# Patient Record
Sex: FEMALE | Race: WHITE | NOT HISPANIC OR LATINO
[De-identification: names, ages, dates, MRNs, and addresses within clinical notes are randomized per-mention and may not be internally consistent; named-entity substitution may affect disease eponyms.]

---

## 2018-11-26 ENCOUNTER — MEDICATION RENEWAL (OUTPATIENT)
Age: 23
End: 2018-11-26

## 2019-02-06 ENCOUNTER — APPOINTMENT (OUTPATIENT)
Dept: INTERNAL MEDICINE | Facility: CLINIC | Age: 24
End: 2019-02-06
Payer: COMMERCIAL

## 2019-02-06 ENCOUNTER — RECORD ABSTRACTING (OUTPATIENT)
Age: 24
End: 2019-02-06

## 2019-02-06 VITALS
BODY MASS INDEX: 26.68 KG/M2 | SYSTOLIC BLOOD PRESSURE: 110 MMHG | HEIGHT: 62 IN | DIASTOLIC BLOOD PRESSURE: 80 MMHG | WEIGHT: 145 LBS

## 2019-02-06 DIAGNOSIS — Z78.9 OTHER SPECIFIED HEALTH STATUS: ICD-10-CM

## 2019-02-06 DIAGNOSIS — G43.009 MIGRAINE W/OUT AURA, NOT INTRACTABLE, W/OUT STATUS MIGRAINOSUS: ICD-10-CM

## 2019-02-06 DIAGNOSIS — M94.0 CHONDROCOSTAL JUNCTION SYNDROME [TIETZE]: ICD-10-CM

## 2019-02-06 DIAGNOSIS — F41.9 ANXIETY DISORDER, UNSPECIFIED: ICD-10-CM

## 2019-02-06 PROCEDURE — 99214 OFFICE O/P EST MOD 30 MIN: CPT

## 2019-02-06 RX ORDER — CETIRIZINE HYDROCHLORIDE 10 MG/1
10 TABLET, FILM COATED ORAL
Refills: 0 | Status: DISCONTINUED | COMMUNITY
End: 2019-02-06

## 2019-02-06 RX ORDER — FLUTICASONE PROPIONATE 50 UG/1
50 SPRAY, METERED NASAL
Refills: 0 | Status: DISCONTINUED | COMMUNITY
End: 2019-02-06

## 2019-02-06 RX ORDER — PSYLLIUM HUSK 0.4 G
CAPSULE ORAL
Refills: 0 | Status: DISCONTINUED | COMMUNITY
End: 2019-02-06

## 2019-02-06 RX ORDER — RIZATRIPTAN BENZOATE 10 MG/1
10 TABLET ORAL
Qty: 10 | Refills: 3 | Status: DISCONTINUED | COMMUNITY
Start: 2018-11-26 | End: 2019-02-06

## 2019-02-06 RX ORDER — FLUVOXAMINE MALEATE 50 MG/1
50 TABLET ORAL
Refills: 0 | Status: DISCONTINUED | COMMUNITY
End: 2019-02-06

## 2019-02-06 RX ORDER — ELECTROLYTES/DEXTROSE
SOLUTION, ORAL ORAL
Refills: 0 | Status: ACTIVE | COMMUNITY

## 2019-02-06 NOTE — REVIEW OF SYSTEMS
[Muscle Pain] : muscle pain [Negative] : Heme/Lymph [FreeTextEntry4] : Migraines/headaches [FreeTextEntry9] : Mid sternal shooting pains

## 2019-02-06 NOTE — HISTORY OF PRESENT ILLNESS
[FreeTextEntry8] : 23-year-old female presents with 2 complaints the first is that she's had headaches almost every day for the past 3 or 4 weeks. She ran out of Maxalt a while ago, Excedrin helps but not as much as the Maxalt. She's been under increased stress recently due to job hunt.\par \par Also complaining about "pain in her breasts", she's recently increased her exercise and it seems since then she has what she describes as shooting pains, that last seconds and occurs randomly and only occasionally, in the middle of her chest that she feels more in the medial aspects of her breasts. No lumps palpated, no redness, she can't relate pains to any particular activity

## 2019-02-06 NOTE — PHYSICAL EXAM
[No Acute Distress] : no acute distress [Well Nourished] : well nourished [Well Developed] : well developed [Well-Appearing] : well-appearing [Normal Sclera/Conjunctiva] : normal sclera/conjunctiva [PERRL] : pupils equal round and reactive to light [EOMI] : extraocular movements intact [Normal Outer Ear/Nose] : the outer ears and nose were normal in appearance [Normal Oropharynx] : the oropharynx was normal [No JVD] : no jugular venous distention [Supple] : supple [No Lymphadenopathy] : no lymphadenopathy [Thyroid Normal, No Nodules] : the thyroid was normal and there were no nodules present [No Respiratory Distress] : no respiratory distress  [Clear to Auscultation] : lungs were clear to auscultation bilaterally [No Accessory Muscle Use] : no accessory muscle use [Normal Rate] : normal rate  [Regular Rhythm] : with a regular rhythm [Normal S1, S2] : normal S1 and S2 [No Murmur] : no murmur heard [No Carotid Bruits] : no carotid bruits [No Abdominal Bruit] : a ~M bruit was not heard ~T in the abdomen [No Varicosities] : no varicosities [Pedal Pulses Present] : the pedal pulses are present [No Edema] : there was no peripheral edema [No Extremity Clubbing/Cyanosis] : no extremity clubbing/cyanosis [No Palpable Aorta] : no palpable aorta [Normal Appearance] : normal in appearance [No Nipple Discharge] : no nipple discharge [No Axillary Lymphadenopathy] : no axillary lymphadenopathy [Soft] : abdomen soft [Non Tender] : non-tender [Non-distended] : non-distended [No Masses] : no abdominal mass palpated [No HSM] : no HSM [Normal Bowel Sounds] : normal bowel sounds [Normal Supraclavicular Nodes] : no supraclavicular lymphadenopathy [Normal Posterior Cervical Nodes] : no posterior cervical lymphadenopathy [Normal Anterior Cervical Nodes] : no anterior cervical lymphadenopathy [No CVA Tenderness] : no CVA  tenderness [No Spinal Tenderness] : no spinal tenderness [No Joint Swelling] : no joint swelling [Grossly Normal Strength/Tone] : grossly normal strength/tone [No Rash] : no rash [Normal Gait] : normal gait [Coordination Grossly Intact] : coordination grossly intact [No Focal Deficits] : no focal deficits [Deep Tendon Reflexes (DTR)] : deep tendon reflexes were 2+ and symmetric [Normal Affect] : the affect was normal [Alert and Oriented x3] : oriented to person, place, and time [Normal Insight/Judgement] : insight and judgment were intact [de-identified] : Chest wall nontender to palpation [de-identified] : Anxious

## 2019-02-06 NOTE — PLAN
[FreeTextEntry1] : 23-year-old female with complaint of increased frequency of migraines in chest/breast pain as noted. Under increased stress looking for a job. Reviewed migraine triggers, refill of Maxalt which she had run out of, reduce stresses much as possible.\par \par Keep track of what she is describing a shooting pains in her breasts. Seems more like costochondritis type pain, breasts are completely normal. Return to office if signs and symptoms increase or persist

## 2019-03-27 ENCOUNTER — APPOINTMENT (OUTPATIENT)
Dept: INTERNAL MEDICINE | Facility: CLINIC | Age: 24
End: 2019-03-27
Payer: COMMERCIAL

## 2019-03-27 VITALS
TEMPERATURE: 97.2 F | WEIGHT: 149 LBS | BODY MASS INDEX: 27.42 KG/M2 | HEIGHT: 62 IN | SYSTOLIC BLOOD PRESSURE: 102 MMHG | DIASTOLIC BLOOD PRESSURE: 70 MMHG

## 2019-03-27 PROCEDURE — 99213 OFFICE O/P EST LOW 20 MIN: CPT

## 2019-03-27 RX ORDER — RIZATRIPTAN BENZOATE 10 MG/1
10 TABLET ORAL
Refills: 0 | Status: COMPLETED | COMMUNITY
End: 2019-03-27

## 2019-03-27 RX ORDER — RIZATRIPTAN BENZOATE 10 MG/1
10 TABLET ORAL DAILY
Qty: 30 | Refills: 2 | Status: COMPLETED | COMMUNITY
Start: 2019-02-06 | End: 2019-03-27

## 2019-03-27 NOTE — PHYSICAL EXAM
[No Acute Distress] : no acute distress [Well Nourished] : well nourished [Well Developed] : well developed [Well-Appearing] : well-appearing [Normal Sclera/Conjunctiva] : normal sclera/conjunctiva [PERRL] : pupils equal round and reactive to light [EOMI] : extraocular movements intact [Normal Outer Ear/Nose] : the outer ears and nose were normal in appearance [Normal Oropharynx] : the oropharynx was normal [No JVD] : no jugular venous distention [Supple] : supple [No Lymphadenopathy] : no lymphadenopathy [No Respiratory Distress] : no respiratory distress  [Clear to Auscultation] : lungs were clear to auscultation bilaterally [No Accessory Muscle Use] : no accessory muscle use [Normal Rate] : normal rate  [Regular Rhythm] : with a regular rhythm [Normal S1, S2] : normal S1 and S2 [No Murmur] : no murmur heard [No Carotid Bruits] : no carotid bruits [No Edema] : there was no peripheral edema [No Extremity Clubbing/Cyanosis] : no extremity clubbing/cyanosis [Declined Breast Exam] : declined breast exam  [Soft] : abdomen soft [Non Tender] : non-tender [Non-distended] : non-distended [Normal Posterior Cervical Nodes] : no posterior cervical lymphadenopathy [Normal Anterior Cervical Nodes] : no anterior cervical lymphadenopathy [No CVA Tenderness] : no CVA  tenderness [No Spinal Tenderness] : no spinal tenderness [No Joint Swelling] : no joint swelling [Grossly Normal Strength/Tone] : grossly normal strength/tone [No Rash] : no rash [Normal Gait] : normal gait [Coordination Grossly Intact] : coordination grossly intact [No Focal Deficits] : no focal deficits [Normal Affect] : the affect was normal [Alert and Oriented x3] : oriented to person, place, and time [Normal Insight/Judgement] : insight and judgment were intact [No Skin Lesions] : no skin lesions [Acne] : no acne [de-identified] : h [de-identified] : darker hair on upper lip

## 2019-03-27 NOTE — PLAN
[FreeTextEntry1] : Prescription for Maxalt renewed. Reviewed all medications, no questions or concerns. Encouraged to take Aleve  with Maxalt to increase efficacy. Educated on continuing to avoid migraine triggers when possible, hormone fluctuations trigger migraines, referred to OB/GYN to discuss possibility of starting birth control for better cyclical migraine control. Discussed that if patient trials birth control with no effect on migraines, may refer to neurology. Return to office if symptoms worsen, change, or persist.

## 2019-03-27 NOTE — REVIEW OF SYSTEMS
[Negative] : Heme/Lymph [Fever] : no fever [Chills] : no chills [Fatigue] : no fatigue [Hot Flashes] : no hot flashes [Night Sweats] : no night sweats [Recent Change In Weight] : ~T no recent weight change [FreeTextEntry2] : migraines, see HPI

## 2019-03-27 NOTE — HISTORY OF PRESENT ILLNESS
[Parent] : parent [FreeTextEntry1] : "I'm still having migraines" [de-identified] : 23 year old female presents for follow up, continuing to have migraines. Has been trying to eat balanced diet, stay hydrated, get adequate sleep, avoid triggers. States she has migraines around her periods which are more severe and harder to get rid of. Periods are irregular, not heavy, has never taken birth control but has seen gynecology. Migraines associated with pain, light and sound sensitivity, mild nausea. When migraines occur takes Excedrin migraine or Maxalt as prescribed, states which ever is closer or more convenient is the one she takes, states good efficacy except when during her period. When she called to make appointment was having migraines every other day, feels now that they have improved slightly but still occurring regularly.

## 2019-06-13 ENCOUNTER — RX RENEWAL (OUTPATIENT)
Age: 24
End: 2019-06-13

## 2019-07-27 ENCOUNTER — APPOINTMENT (OUTPATIENT)
Dept: INTERNAL MEDICINE | Facility: CLINIC | Age: 24
End: 2019-07-27
Payer: COMMERCIAL

## 2019-07-27 VITALS
DIASTOLIC BLOOD PRESSURE: 72 MMHG | WEIGHT: 160 LBS | SYSTOLIC BLOOD PRESSURE: 126 MMHG | BODY MASS INDEX: 29.44 KG/M2 | HEIGHT: 62 IN

## 2019-07-27 PROCEDURE — 99213 OFFICE O/P EST LOW 20 MIN: CPT

## 2019-07-27 NOTE — PHYSICAL EXAM
[No Acute Distress] : no acute distress [Well Nourished] : well nourished [Well Developed] : well developed [Well-Appearing] : well-appearing [Normal Sclera/Conjunctiva] : normal sclera/conjunctiva [PERRL] : pupils equal round and reactive to light [EOMI] : extraocular movements intact [Normal Outer Ear/Nose] : the outer ears and nose were normal in appearance [No JVD] : no jugular venous distention [No Lymphadenopathy] : no lymphadenopathy [Supple] : supple [Thyroid Normal, No Nodules] : the thyroid was normal and there were no nodules present [No Respiratory Distress] : no respiratory distress  [Clear to Auscultation] : lungs were clear to auscultation bilaterally [Normal Rate] : normal rate  [No Accessory Muscle Use] : no accessory muscle use [Normal S1, S2] : normal S1 and S2 [No Murmur] : no murmur heard [Regular Rhythm] : with a regular rhythm [No Abdominal Bruit] : a ~M bruit was not heard ~T in the abdomen [No Varicosities] : no varicosities [No Carotid Bruits] : no carotid bruits [No Palpable Aorta] : no palpable aorta [Pedal Pulses Present] : the pedal pulses are present [No Edema] : there was no peripheral edema [Non Tender] : non-tender [Soft] : abdomen soft [No Extremity Clubbing/Cyanosis] : no extremity clubbing/cyanosis [No HSM] : no HSM [No Masses] : no abdominal mass palpated [Non-distended] : non-distended [Normal Posterior Cervical Nodes] : no posterior cervical lymphadenopathy [Normal Bowel Sounds] : normal bowel sounds [No CVA Tenderness] : no CVA  tenderness [No Spinal Tenderness] : no spinal tenderness [Normal Anterior Cervical Nodes] : no anterior cervical lymphadenopathy [No Joint Swelling] : no joint swelling [No Rash] : no rash [Grossly Normal Strength/Tone] : grossly normal strength/tone [No Focal Deficits] : no focal deficits [Coordination Grossly Intact] : coordination grossly intact [Normal Gait] : normal gait [Normal Insight/Judgement] : insight and judgment were intact [Deep Tendon Reflexes (DTR)] : deep tendon reflexes were 2+ and symmetric [Normal Affect] : the affect was normal [de-identified] : Mildly injected posterior pharynx

## 2019-07-27 NOTE — HISTORY OF PRESENT ILLNESS
[FreeTextEntry8] : Patient is a 24 -year-old female presenting with 18 hours of a temperature of between 100 201. Loss of appetite, scratchy throat. In fatigued. She has not been exposed to ticks. She has a dog that occasionally has it did pick on it, but has not seen any ticks in house and is otherwise been healthy. Her systems review is 100%, normal.

## 2019-07-31 ENCOUNTER — APPOINTMENT (OUTPATIENT)
Dept: INTERNAL MEDICINE | Facility: CLINIC | Age: 24
End: 2019-07-31
Payer: COMMERCIAL

## 2019-07-31 VITALS
SYSTOLIC BLOOD PRESSURE: 110 MMHG | BODY MASS INDEX: 29.44 KG/M2 | TEMPERATURE: 101.2 F | DIASTOLIC BLOOD PRESSURE: 74 MMHG | WEIGHT: 160 LBS | HEIGHT: 62 IN

## 2019-07-31 PROCEDURE — 99213 OFFICE O/P EST LOW 20 MIN: CPT

## 2019-07-31 NOTE — HISTORY OF PRESENT ILLNESS
[FreeTextEntry8] : 24-year-old female presents with complaint of continuing to feel sick, with fever, chills and cough for 5 days

## 2019-07-31 NOTE — REVIEW OF SYSTEMS
[Chills] : chills [Fever] : fever [Fatigue] : fatigue [Cough] : cough [Negative] : Heme/Lymph [Wheezing] : no wheezing

## 2019-07-31 NOTE — PHYSICAL EXAM
[Well Nourished] : well nourished [No Acute Distress] : no acute distress [Well Developed] : well developed [Well-Appearing] : well-appearing [PERRL] : pupils equal round and reactive to light [Normal Sclera/Conjunctiva] : normal sclera/conjunctiva [EOMI] : extraocular movements intact [Normal Outer Ear/Nose] : the outer ears and nose were normal in appearance [No JVD] : no jugular venous distention [Normal Oropharynx] : the oropharynx was normal [Supple] : supple [No Lymphadenopathy] : no lymphadenopathy [No Respiratory Distress] : no respiratory distress  [Thyroid Normal, No Nodules] : the thyroid was normal and there were no nodules present [No Accessory Muscle Use] : no accessory muscle use [Clear to Auscultation] : lungs were clear to auscultation bilaterally [Normal Rate] : normal rate  [Regular Rhythm] : with a regular rhythm [Normal S1, S2] : normal S1 and S2 [No Murmur] : no murmur heard [No Carotid Bruits] : no carotid bruits [No Abdominal Bruit] : a ~M bruit was not heard ~T in the abdomen [No Varicosities] : no varicosities [Pedal Pulses Present] : the pedal pulses are present [No Edema] : there was no peripheral edema [No Palpable Aorta] : no palpable aorta [Soft] : abdomen soft [No Extremity Clubbing/Cyanosis] : no extremity clubbing/cyanosis [Non Tender] : non-tender [Non-distended] : non-distended [No HSM] : no HSM [No Masses] : no abdominal mass palpated [Normal Bowel Sounds] : normal bowel sounds [Normal Posterior Cervical Nodes] : no posterior cervical lymphadenopathy [Normal Anterior Cervical Nodes] : no anterior cervical lymphadenopathy [No Spinal Tenderness] : no spinal tenderness [No CVA Tenderness] : no CVA  tenderness [No Joint Swelling] : no joint swelling [Grossly Normal Strength/Tone] : grossly normal strength/tone [No Rash] : no rash [Coordination Grossly Intact] : coordination grossly intact [Normal Gait] : normal gait [No Focal Deficits] : no focal deficits [Deep Tendon Reflexes (DTR)] : deep tendon reflexes were 2+ and symmetric [Normal Affect] : the affect was normal [Normal Insight/Judgement] : insight and judgment were intact [de-identified] : cough

## 2019-07-31 NOTE — PLAN
[FreeTextEntry1] : 24-year-old female with cough fever fatigue for 5 days. Given prescription for Zithromax. Advised fluids and rest. If sinus symptoms increase or persist return to office

## 2019-11-21 ENCOUNTER — APPOINTMENT (OUTPATIENT)
Dept: INTERNAL MEDICINE | Facility: CLINIC | Age: 24
End: 2019-11-21
Payer: COMMERCIAL

## 2019-11-21 VITALS
HEIGHT: 62 IN | SYSTOLIC BLOOD PRESSURE: 100 MMHG | WEIGHT: 156 LBS | DIASTOLIC BLOOD PRESSURE: 66 MMHG | TEMPERATURE: 99 F | BODY MASS INDEX: 28.71 KG/M2

## 2019-11-21 DIAGNOSIS — S86.891A OTHER INJURY OF OTHER MUSCLE(S) AND TENDON(S) AT LOWER LEG LEVEL, RIGHT LEG, INITIAL ENCOUNTER: ICD-10-CM

## 2019-11-21 DIAGNOSIS — H01.133 ECZEMATOUS DERMATITIS OF RIGHT EYE, UNSPECIFIED EYELID: ICD-10-CM

## 2019-11-21 PROCEDURE — 99213 OFFICE O/P EST LOW 20 MIN: CPT

## 2019-11-21 RX ORDER — AZITHROMYCIN 500 MG/1
500 TABLET, FILM COATED ORAL DAILY
Qty: 1 | Refills: 0 | Status: DISCONTINUED | COMMUNITY
Start: 2019-07-31 | End: 2019-11-21

## 2019-11-21 RX ORDER — RIZATRIPTAN BENZOATE 10 MG/1
10 TABLET ORAL
Qty: 10 | Refills: 3 | Status: DISCONTINUED | COMMUNITY
Start: 2019-03-27 | End: 2019-11-21

## 2019-11-21 NOTE — PHYSICAL EXAM
[No Acute Distress] : no acute distress [Well Nourished] : well nourished [Well Developed] : well developed [Well-Appearing] : well-appearing [Normal Sclera/Conjunctiva] : normal sclera/conjunctiva [PERRL] : pupils equal round and reactive to light [EOMI] : extraocular movements intact [Normal Outer Ear/Nose] : the outer ears and nose were normal in appearance [Normal Oropharynx] : the oropharynx was normal [No JVD] : no jugular venous distention [No Lymphadenopathy] : no lymphadenopathy [Supple] : supple [Thyroid Normal, No Nodules] : the thyroid was normal and there were no nodules present [No Respiratory Distress] : no respiratory distress  [No Accessory Muscle Use] : no accessory muscle use [Clear to Auscultation] : lungs were clear to auscultation bilaterally [Normal Rate] : normal rate  [Regular Rhythm] : with a regular rhythm [Normal S1, S2] : normal S1 and S2 [No Murmur] : no murmur heard [No Carotid Bruits] : no carotid bruits [No Abdominal Bruit] : a ~M bruit was not heard ~T in the abdomen [No Varicosities] : no varicosities [Pedal Pulses Present] : the pedal pulses are present [No Edema] : there was no peripheral edema [No Palpable Aorta] : no palpable aorta [No Extremity Clubbing/Cyanosis] : no extremity clubbing/cyanosis [Soft] : abdomen soft [Non Tender] : non-tender [Non-distended] : non-distended [No Masses] : no abdominal mass palpated [No HSM] : no HSM [Normal Bowel Sounds] : normal bowel sounds [Normal Posterior Cervical Nodes] : no posterior cervical lymphadenopathy [Normal Anterior Cervical Nodes] : no anterior cervical lymphadenopathy [No CVA Tenderness] : no CVA  tenderness [No Spinal Tenderness] : no spinal tenderness [No Joint Swelling] : no joint swelling [Grossly Normal Strength/Tone] : grossly normal strength/tone [No Rash] : no rash [Coordination Grossly Intact] : coordination grossly intact [No Focal Deficits] : no focal deficits [Normal Gait] : normal gait [Deep Tendon Reflexes (DTR)] : deep tendon reflexes were 2+ and symmetric [Normal Affect] : the affect was normal [Normal Insight/Judgement] : insight and judgment were intact [de-identified] : Dime size patch dry slightly erythematous skin right upper eyelid near eyebrow, dry skin left external ear

## 2019-11-21 NOTE — PLAN
[FreeTextEntry1] : 24-year-old overweight female presents with complaints as mentioned. She will try Imitrex 50 mg with Aleve for her migraine. Given Elocon to be used sparingly for eczema\par \par Given some exercises to do for her knees and shin splints, advised better shoes for work, if no improvement will give prescription for physical therapy\par \par Followup p.r.n.

## 2019-11-21 NOTE — REVIEW OF SYSTEMS
[Joint Pain] : joint pain [Negative] : Heme/Lymph [Joint Swelling] : no joint swelling [Muscle Weakness] : no muscle weakness [Back Pain] : no back pain [FreeTextEntry9] : kneeshin pain

## 2019-12-05 ENCOUNTER — APPOINTMENT (OUTPATIENT)
Dept: INTERNAL MEDICINE | Facility: CLINIC | Age: 24
End: 2019-12-05

## 2020-01-11 ENCOUNTER — APPOINTMENT (OUTPATIENT)
Dept: INTERNAL MEDICINE | Facility: CLINIC | Age: 25
End: 2020-01-11
Payer: COMMERCIAL

## 2020-01-11 VITALS
BODY MASS INDEX: 28.71 KG/M2 | SYSTOLIC BLOOD PRESSURE: 116 MMHG | WEIGHT: 156 LBS | TEMPERATURE: 97.6 F | DIASTOLIC BLOOD PRESSURE: 70 MMHG | HEIGHT: 62 IN

## 2020-01-11 DIAGNOSIS — R09.82 POSTNASAL DRIP: ICD-10-CM

## 2020-01-11 DIAGNOSIS — J06.9 ACUTE UPPER RESPIRATORY INFECTION, UNSPECIFIED: ICD-10-CM

## 2020-01-11 DIAGNOSIS — B97.89 ACUTE UPPER RESPIRATORY INFECTION, UNSPECIFIED: ICD-10-CM

## 2020-01-11 DIAGNOSIS — Z87.09 PERSONAL HISTORY OF OTHER DISEASES OF THE RESPIRATORY SYSTEM: ICD-10-CM

## 2020-01-11 PROCEDURE — 99213 OFFICE O/P EST LOW 20 MIN: CPT

## 2020-01-11 NOTE — PHYSICAL EXAM
[Normal Oropharynx] : the oropharynx was normal [Normal TMs] : both tympanic membranes were normal [de-identified] : visible postnasal drip [Normal] : normal gait, coordination grossly intact, no focal deficits and deep tendon reflexes were 2+ and symmetric

## 2020-01-11 NOTE — REVIEW OF SYSTEMS
[Earache] : no earache [Hoarseness] : no hoarseness [Nasal Discharge] : no nasal discharge [Sore Throat] : sore throat [Wheezing] : no wheezing [Postnasal Drip] : postnasal drip [Cough] : cough [Negative] : Neurological

## 2020-12-04 ENCOUNTER — APPOINTMENT (OUTPATIENT)
Dept: INTERNAL MEDICINE | Facility: CLINIC | Age: 25
End: 2020-12-04

## 2020-12-23 PROBLEM — J06.9 VIRAL URI WITH COUGH: Status: RESOLVED | Noted: 2019-07-27 | Resolved: 2020-12-23

## 2020-12-23 PROBLEM — Z87.09 HISTORY OF ACUTE BRONCHITIS: Status: RESOLVED | Noted: 2019-07-31 | Resolved: 2020-12-23

## 2021-06-14 ENCOUNTER — APPOINTMENT (OUTPATIENT)
Dept: INTERNAL MEDICINE | Facility: CLINIC | Age: 26
End: 2021-06-14
Payer: COMMERCIAL

## 2021-06-14 VITALS
SYSTOLIC BLOOD PRESSURE: 110 MMHG | WEIGHT: 181 LBS | DIASTOLIC BLOOD PRESSURE: 70 MMHG | HEIGHT: 62 IN | BODY MASS INDEX: 33.31 KG/M2

## 2021-06-14 DIAGNOSIS — H81.10 BENIGN PAROXYSMAL VERTIGO, UNSPECIFIED EAR: ICD-10-CM

## 2021-06-14 DIAGNOSIS — R53.83 OTHER FATIGUE: ICD-10-CM

## 2021-06-14 DIAGNOSIS — J30.2 OTHER SEASONAL ALLERGIC RHINITIS: ICD-10-CM

## 2021-06-14 DIAGNOSIS — Z23 ENCOUNTER FOR IMMUNIZATION: ICD-10-CM

## 2021-06-14 PROCEDURE — 99213 OFFICE O/P EST LOW 20 MIN: CPT | Mod: 25

## 2021-06-14 PROCEDURE — 36415 COLL VENOUS BLD VENIPUNCTURE: CPT

## 2021-06-14 PROCEDURE — 99072 ADDL SUPL MATRL&STAF TM PHE: CPT

## 2021-06-14 RX ORDER — METHYLPREDNISOLONE 4 MG/1
4 TABLET ORAL
Qty: 21 | Refills: 0 | Status: COMPLETED | COMMUNITY
Start: 2021-02-08

## 2021-06-14 RX ORDER — NORETHINDRONE ACETATE AND ETHINYL ESTRADIOL AND FERROUS FUMARATE 1MG-20(21)
1-20 KIT ORAL
Qty: 28 | Refills: 0 | Status: ACTIVE | COMMUNITY
Start: 2021-06-04

## 2021-06-14 RX ORDER — AMOXICILLIN 875 MG/1
875 TABLET, FILM COATED ORAL
Qty: 14 | Refills: 0 | Status: COMPLETED | COMMUNITY
Start: 2021-01-29

## 2021-06-14 RX ORDER — CHLORHEXIDINE GLUCONATE, 0.12% ORAL RINSE 1.2 MG/ML
0.12 SOLUTION DENTAL
Qty: 473 | Refills: 0 | Status: COMPLETED | COMMUNITY
Start: 2021-01-04

## 2021-06-14 RX ORDER — NAPROXEN 500 MG/1
500 TABLET ORAL
Qty: 28 | Refills: 0 | Status: COMPLETED | COMMUNITY
Start: 2021-01-04

## 2021-06-14 RX ORDER — ESCITALOPRAM OXALATE 20 MG/1
20 TABLET ORAL
Qty: 30 | Refills: 0 | Status: DISCONTINUED | COMMUNITY
Start: 2021-05-25

## 2021-06-14 RX ORDER — OXYCODONE AND ACETAMINOPHEN 5; 325 MG/1; MG/1
5-325 TABLET ORAL
Qty: 20 | Refills: 0 | Status: COMPLETED | COMMUNITY
Start: 2021-02-08

## 2021-06-14 NOTE — PLAN
[FreeTextEntry1] : 26-year-old female with complaints as noted. For several months she's been under increased stress, has not been eating or sleeping well and is now complaining of increase in frequency of migraines as well as positional vertigo. She also states that for the past 2-3 days she feels much better since she's been getting a good night sleep. She's tried both Maxalt and Imitrex but doesn't like the way they make her feel. She also occasionally takes Excedrin which sometimes works and sometimes doesn't she gets a headache.\par Also advised seasonal allergies may be triggering her migraines\par Long discussion of lifestyle, triggers, diet and exercise. Advised her to continue with positive changes and hopefully her symptoms improve but if they don't I referred her to neurology and given her the names and telephone numbers. Also advised her to change positions slowly. Blood work done today to make sure there is no anemia, thyroid etc. issue, colon 2-3 days to review lab work

## 2021-06-14 NOTE — HISTORY OF PRESENT ILLNESS
[FreeTextEntry1] : Followup migraines [de-identified] : 26-year-old female presents with her mom complaining that her migraines have increased in frequency over the past 2-3 months and she's also complaining about occasional positional vertigo that lasts anywhere from a few seconds to a few minutes but no associated symptoms. Patient states that the dizziness and migraines are not related, but they do not occur at the same time. A few episodes of dizziness over the past few months have occurred when she's gotten out of bed in the morning or change position quickly. Increase in headache frequency started in April. We discussed triggers in detail. Patient has been out of work for over a year due to pandemic and is now attending school online to earn her bachelors and looking for another job. She also normally suffers from seasonal allergies. She states that she feels better the last couple of days since she has been getting a better night sleep and eating better

## 2021-06-15 LAB
ALBUMIN SERPL ELPH-MCNC: 4.1 G/DL
ALP BLD-CCNC: 111 U/L
ALT SERPL-CCNC: 10 U/L
ANION GAP SERPL CALC-SCNC: 13 MMOL/L
AST SERPL-CCNC: 12 U/L
BASOPHILS # BLD AUTO: 0.06 K/UL
BASOPHILS NFR BLD AUTO: 0.5 %
BILIRUB SERPL-MCNC: <0.2 MG/DL
BUN SERPL-MCNC: 13 MG/DL
CALCIUM SERPL-MCNC: 9.5 MG/DL
CHLORIDE SERPL-SCNC: 104 MMOL/L
CO2 SERPL-SCNC: 22 MMOL/L
CREAT SERPL-MCNC: 0.55 MG/DL
EOSINOPHIL # BLD AUTO: 0.18 K/UL
EOSINOPHIL NFR BLD AUTO: 1.5 %
FERRITIN SERPL-MCNC: 36 NG/ML
GLUCOSE SERPL-MCNC: 64 MG/DL
HCT VFR BLD CALC: 39.3 %
HGB BLD-MCNC: 11.3 G/DL
IMM GRANULOCYTES NFR BLD AUTO: 0.2 %
IRON SATN MFR SERPL: 18 %
IRON SERPL-MCNC: 60 UG/DL
LYMPHOCYTES # BLD AUTO: 2.53 K/UL
LYMPHOCYTES NFR BLD AUTO: 20.6 %
MAN DIFF?: NORMAL
MCHC RBC-ENTMCNC: 21 PG
MCHC RBC-ENTMCNC: 28.8 GM/DL
MCV RBC AUTO: 72.9 FL
MONOCYTES # BLD AUTO: 0.78 K/UL
MONOCYTES NFR BLD AUTO: 6.4 %
NEUTROPHILS # BLD AUTO: 8.69 K/UL
NEUTROPHILS NFR BLD AUTO: 70.8 %
PLATELET # BLD AUTO: 471 K/UL
POTASSIUM SERPL-SCNC: 5 MMOL/L
PROT SERPL-MCNC: 7.5 G/DL
RBC # BLD: 5.39 M/UL
RBC # FLD: 17.2 %
SODIUM SERPL-SCNC: 139 MMOL/L
T4 FREE SERPL-MCNC: 0.9 NG/DL
TIBC SERPL-MCNC: 330 UG/DL
TSH SERPL-ACNC: 1.24 UIU/ML
UIBC SERPL-MCNC: 271 UG/DL
WBC # FLD AUTO: 12.27 K/UL

## 2021-08-16 ENCOUNTER — APPOINTMENT (OUTPATIENT)
Dept: NEUROLOGY | Facility: CLINIC | Age: 26
End: 2021-08-16
Payer: COMMERCIAL

## 2021-08-16 VITALS
BODY MASS INDEX: 33.13 KG/M2 | DIASTOLIC BLOOD PRESSURE: 77 MMHG | TEMPERATURE: 97.2 F | WEIGHT: 180 LBS | HEART RATE: 108 BPM | SYSTOLIC BLOOD PRESSURE: 119 MMHG | HEIGHT: 62 IN

## 2021-08-16 PROCEDURE — 99203 OFFICE O/P NEW LOW 30 MIN: CPT

## 2021-08-17 RX ORDER — NORETHINDRONE ACETATE/ETHINYL ESTRADIOL 1MG-20MCG
1-20 KIT ORAL
Qty: 21 | Refills: 0 | Status: DISCONTINUED | COMMUNITY
Start: 2019-07-26 | End: 2021-08-17

## 2021-08-17 RX ORDER — SUMATRIPTAN 50 MG/1
50 TABLET, FILM COATED ORAL
Qty: 10 | Refills: 1 | Status: DISCONTINUED | COMMUNITY
Start: 2019-11-21 | End: 2021-08-17

## 2021-08-17 RX ORDER — MOMETASONE FUROATE 1 MG/G
0.1 CREAM TOPICAL DAILY
Qty: 1 | Refills: 0 | Status: DISCONTINUED | COMMUNITY
Start: 2019-11-21 | End: 2021-08-17

## 2021-08-17 RX ORDER — IBUPROFEN 200 MG/1
200 CAPSULE, LIQUID FILLED ORAL
Refills: 0 | Status: DISCONTINUED | COMMUNITY
Start: 2019-07-31 | End: 2021-08-17

## 2021-08-17 RX ORDER — LISDEXAMFETAMINE DIMESYLATE 20 MG/1
20 CAPSULE ORAL
Qty: 30 | Refills: 0 | Status: DISCONTINUED | COMMUNITY
Start: 2019-12-11 | End: 2021-08-17

## 2021-08-22 NOTE — REVIEW OF SYSTEMS
[Migraine Headache] : migraine headaches [Anxiety] : anxiety [Depression] : depression [Abdominal Pain] : abdominal pain [Negative] : Heme/Lymph [FreeTextEntry2] : fatigue,weakness,night time sweats,allergies  [de-identified] : bizarre thoughts,mood swings  [FreeTextEntry4] : ringing in ears

## 2021-08-22 NOTE — ASSESSMENT
[FreeTextEntry1] : Regina Mtz is a 26 year old woman with migraine without aura. \par \par Preventative medications for migraines discussed. She prefers to only take abortive medications for now. If Imitrex is tolerated will add Migrelief at next visit.\par \par Snoring- ?NAVA. Home sleep study to evaluate for NAVA as contributing factor to headaches. \par \par Follow up in 6 weeks. \par \par \par I discussed in detail with the patient the diagnosis, prognosis, treatment plan and answered all of their questions.\par \par

## 2021-08-22 NOTE — PHYSICAL EXAM
[FreeTextEntry1] : Physical examination \par General: No acute distress, Awake, Alert.   \par \par Mental status \par Awake, alert, gives detailed history. \par   \par Cranial Nerves \par II: VFF  \par III, IV, VI: PERRL, EOMI.   \par V: Facial sensation is normal B/L.   \par VII: Facial strength is normal B/L. \par \par \par VIII: Gross hearing is intact.   \par \par \par IX, X: Palate is midline and elevates symmetrically.   \par XI: Trapezius normal strength.   \par XII: Tongue midline without atrophy or fasciculations. \par \par Motor exam  \par Muscle tone - no evidence of rigidity or resistance in all 4 extremities.  \par No atrophy or fasciculations \par Muscle Strength: arms and legs, proximal and distal flexors and extensors are normal \par \par No UE drift.\par \par Reflexes \par All present, normal, and symmetrical.   \par \par \par Plantars right: mute.   \par Plantars left: mute. \par \par Coordination \par Finger to nose: Normal.  \par Heel to shin: Normal.   \par \par \par Sensory \par Intact sensation to vibration and cold.\par \par \par Gait \par Normal including heels, toes, and tandem gait.  \par \par

## 2021-08-22 NOTE — CONSULT LETTER
[Dear  ___] : Dear ~TAVO, [FreeTextEntry1] : I had the pleasure of evaluating your patient, DOT DOYLE. Please see the assessment section below for a summary of my diagnostic impression and plan.\par \par Thank you very much for allowing me to participate in the care of this patient. If you have any questions, please do not hesitate to contact me. \par \par Sincerely,\par \par Brenna Villanueva MD\par Talia Bazan N.P.\par

## 2021-08-22 NOTE — HISTORY OF PRESENT ILLNESS
[FreeTextEntry1] : Regina Mtz is a 26 year old woman with a history of migraines presenting for a consultation for headaches.\par \par Headache\par Age of onset-many years ago. Increased in frequency when she started college in April. \par location-left temporal. sometimes right temporal. \par description-pounding. could interfere with her daily activities. she will need to take Excedrin and sleep for relief. \par unilateral or bilateral-unilateral. \par Quality-pounding. ranging in intensity.\par Nausea or vomiting-nausea. no vomiting.\par Photophobia or phonophobia- photophobia. no phonophobia.\par warning/aura-none\par post-drome-none\par nocturnal awakening-none\par association with exertion or Valsalva- none. \par Duration- ranges from 30 minutes to four hours.\par Average #/week- varies. could be every other day and some weeks less.\par Association with menstrual cycle- prior to and after menstrual cycle.\par history of trauma- none\par contraceptive use- Junel \par Triggers-rain, heat, processed sugar, red wine, MS, lack of caffeine \par sleep- inconsistent. could wake up with a headache. light snoring.\par water intake- not enough water.\par caffeine intake- 1 cup or more. \par family history- none\par medications tried for relief- Feverfew (only for a short time), Excedrin.  \par past medications tried- Rizatriptan caused drowsiness, ?rapid heart rate, increased urination. \par Last MRI Brain reported 2016. \par \par The remaining neurological review of systems.\par

## 2021-08-26 ENCOUNTER — APPOINTMENT (OUTPATIENT)
Dept: INTERNAL MEDICINE | Facility: CLINIC | Age: 26
End: 2021-08-26
Payer: COMMERCIAL

## 2021-08-26 ENCOUNTER — APPOINTMENT (OUTPATIENT)
Dept: NEUROLOGY | Facility: CLINIC | Age: 26
End: 2021-08-26
Payer: COMMERCIAL

## 2021-08-26 VITALS
HEIGHT: 62 IN | BODY MASS INDEX: 32.76 KG/M2 | DIASTOLIC BLOOD PRESSURE: 74 MMHG | WEIGHT: 178 LBS | SYSTOLIC BLOOD PRESSURE: 124 MMHG

## 2021-08-26 DIAGNOSIS — Z11.1 ENCOUNTER FOR SCREENING FOR RESPIRATORY TUBERCULOSIS: ICD-10-CM

## 2021-08-26 DIAGNOSIS — Z00.00 ENCOUNTER FOR GENERAL ADULT MEDICAL EXAMINATION W/OUT ABNORMAL FINDINGS: ICD-10-CM

## 2021-08-26 DIAGNOSIS — S86.911A STRAIN OF UNSPECIFIED MUSCLE(S) AND TENDON(S) AT LOWER LEG LEVEL, RIGHT LEG, INITIAL ENCOUNTER: ICD-10-CM

## 2021-08-26 PROCEDURE — 95806 SLEEP STUDY UNATT&RESP EFFT: CPT

## 2021-08-26 PROCEDURE — 36415 COLL VENOUS BLD VENIPUNCTURE: CPT

## 2021-08-26 PROCEDURE — 99395 PREV VISIT EST AGE 18-39: CPT | Mod: 25

## 2021-08-26 NOTE — REVIEW OF SYSTEMS
[Muscle Pain] : muscle pain [Negative] : Heme/Lymph [Muscle Weakness] : no muscle weakness [FreeTextEntry9] : right calf, posterior ankle

## 2021-08-26 NOTE — HEALTH RISK ASSESSMENT
[Good] : ~his/her~  mood as  good [Yes] : Yes [1 or 2 (0 pts)] : 1 or 2 (0 points) [Never (0 pts)] : Never (0 points) [No falls in past year] : Patient reported no falls in the past year [0] : 2) Feeling down, depressed, or hopeless: Not at all (0) [Patient declined mammogram] : Patient declined mammogram [Patient declined PAP Smear] : Patient declined PAP Smear [Patient declined bone density test] : Patient declined bone density test [Patient declined colonoscopy] : Patient declined colonoscopy [HIV test declined] : HIV test declined [Hepatitis C test declined] : Hepatitis C test declined [] : No [[id number]] : 0

## 2021-08-26 NOTE — PHYSICAL EXAM
[Obese] : patient was observed to be obese [Normal Female:] : bladder was normal on palpation [Normal] : normal gait, coordination grossly intact, no focal deficits [de-identified] : Deferred GYN; Denies pain, lumps and discharge [FreeTextEntry1] : Deferred GI/GYN [de-identified] : No lymphadenopathy [de-identified] : Denies excessive thirst, urination, fatigue [de-identified] : No rash or skin lesion [de-identified] : Alert and Oriented x3.  Appropriate mood and affect

## 2021-08-26 NOTE — HISTORY OF PRESENT ILLNESS
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 Complaining pain right calf and posterior ankle for the past few weeks after doing a lot of walking in flip-flops on vacation. Pain is not consistent it doesn't necessarily stop when she stops walking.\par Recently saw neurology for migraines\par Denies chest pain shortness of breath palpitations dizziness\par Up-to-date with screenings

## 2021-08-26 NOTE — PLAN
[FreeTextEntry1] : 26-year-old female as noted presents for annual exam complaining about right lateral calf pain and some posterior ankle tenderness with walking, started after doing a lot of walking on vacation. Homans sign is negative, no erythema right calf. Advised Aleve and followup in one week sooner if pain increases.\par Reviewed diet stressed importance of healthy weight and regular exercise\par \par Paperwork will be completed for a new job\par Call one week to review labs

## 2021-08-27 ENCOUNTER — APPOINTMENT (OUTPATIENT)
Dept: NEUROLOGY | Facility: CLINIC | Age: 26
End: 2021-08-27

## 2021-08-27 LAB
ALBUMIN SERPL ELPH-MCNC: 4.4 G/DL
ALP BLD-CCNC: 119 U/L
ALT SERPL-CCNC: 17 U/L
ANION GAP SERPL CALC-SCNC: 16 MMOL/L
APPEARANCE: CLEAR
AST SERPL-CCNC: 17 U/L
BASOPHILS # BLD AUTO: 0.05 K/UL
BASOPHILS NFR BLD AUTO: 0.4 %
BILIRUB SERPL-MCNC: <0.2 MG/DL
BILIRUBIN URINE: NEGATIVE
BLOOD URINE: ABNORMAL
BUN SERPL-MCNC: 12 MG/DL
CALCIUM SERPL-MCNC: 9.9 MG/DL
CHLORIDE SERPL-SCNC: 102 MMOL/L
CHOLEST SERPL-MCNC: 164 MG/DL
CO2 SERPL-SCNC: 23 MMOL/L
COLOR: YELLOW
CREAT SERPL-MCNC: 0.66 MG/DL
EOSINOPHIL # BLD AUTO: 0.2 K/UL
EOSINOPHIL NFR BLD AUTO: 1.7 %
FOLATE SERPL-MCNC: >20 NG/ML
GLUCOSE QUALITATIVE U: NEGATIVE
GLUCOSE SERPL-MCNC: 88 MG/DL
HCT VFR BLD CALC: 41.6 %
HDLC SERPL-MCNC: 43 MG/DL
HGB BLD-MCNC: 11.8 G/DL
IMM GRANULOCYTES NFR BLD AUTO: 0.3 %
KETONES URINE: NEGATIVE
LDLC SERPL CALC-MCNC: 93 MG/DL
LEUKOCYTE ESTERASE URINE: NEGATIVE
LYMPHOCYTES # BLD AUTO: 3.74 K/UL
LYMPHOCYTES NFR BLD AUTO: 31.9 %
MAN DIFF?: NORMAL
MCHC RBC-ENTMCNC: 21.2 PG
MCHC RBC-ENTMCNC: 28.4 GM/DL
MCV RBC AUTO: 74.7 FL
MONOCYTES # BLD AUTO: 0.94 K/UL
MONOCYTES NFR BLD AUTO: 8 %
NEUTROPHILS # BLD AUTO: 6.74 K/UL
NEUTROPHILS NFR BLD AUTO: 57.7 %
NITRITE URINE: POSITIVE
NONHDLC SERPL-MCNC: 122 MG/DL
PH URINE: 6
PLATELET # BLD AUTO: 458 K/UL
POTASSIUM SERPL-SCNC: 4 MMOL/L
PROT SERPL-MCNC: 7.8 G/DL
PROTEIN URINE: NORMAL
RBC # BLD: 5.57 M/UL
RBC # FLD: 18 %
SODIUM SERPL-SCNC: 140 MMOL/L
SPECIFIC GRAVITY URINE: >=1.03
T4 FREE SERPL-MCNC: 1 NG/DL
TRIGL SERPL-MCNC: 145 MG/DL
TSH SERPL-ACNC: 2.03 UIU/ML
UROBILINOGEN URINE: NORMAL
VIT B12 SERPL-MCNC: 519 PG/ML
WBC # FLD AUTO: 11.71 K/UL

## 2021-08-28 LAB
FERRITIN SERPL-MCNC: 41 NG/ML
IRON SATN MFR SERPL: 12 %
IRON SERPL-MCNC: 40 UG/DL
TIBC SERPL-MCNC: 326 UG/DL
UIBC SERPL-MCNC: 286 UG/DL

## 2021-08-29 LAB
M TB IFN-G BLD-IMP: NEGATIVE
QUANTIFERON TB PLUS MITOGEN MINUS NIL: 7.88 IU/ML
QUANTIFERON TB PLUS NIL: 0.02 IU/ML
QUANTIFERON TB PLUS TB1 MINUS NIL: 0 IU/ML
QUANTIFERON TB PLUS TB2 MINUS NIL: 0.01 IU/ML

## 2021-09-03 ENCOUNTER — NON-APPOINTMENT (OUTPATIENT)
Age: 26
End: 2021-09-03

## 2021-11-10 ENCOUNTER — APPOINTMENT (OUTPATIENT)
Dept: NEUROLOGY | Facility: CLINIC | Age: 26
End: 2021-11-10
Payer: COMMERCIAL

## 2021-11-10 VITALS
HEART RATE: 81 BPM | SYSTOLIC BLOOD PRESSURE: 117 MMHG | HEIGHT: 62 IN | TEMPERATURE: 97.3 F | WEIGHT: 175 LBS | BODY MASS INDEX: 32.2 KG/M2 | DIASTOLIC BLOOD PRESSURE: 80 MMHG

## 2021-11-10 PROCEDURE — 99214 OFFICE O/P EST MOD 30 MIN: CPT

## 2021-11-10 NOTE — HISTORY OF PRESENT ILLNESS
[FreeTextEntry1] : Regina Mtz is a 26 year old woman with a history of migraines presenting for a follow up appointment for headaches. \par \par She missed two days of work and one day she left school early due to the headaches. She describes a fuzziness, possible increased heart rate and urination after taking Sumatriptan. Denies photophobia. Her headaches increased with the change in weather initially. She reports relief with one dose of Sumatriptan and Excedrin. She endorses one headache this week-on Tuesday - took one dose of Sumatriptan with relief. \par \par The remaining neurological review of systems is negative. \par \par 8/16/21\par Regina Mtz is a 26 year old woman with a history of migraines presenting for a consultation for headaches.\par \par Headache\par Age of onset-many years ago. Increased in frequency when she started college in April. \par location-left temporal. sometimes right temporal. \par description-pounding. could interfere with her daily activities. she will need to take Excedrin and sleep for relief. \par unilateral or bilateral-unilateral. \par Quality-pounding. ranging in intensity.\par Nausea or vomiting-nausea. no vomiting.\par Photophobia or phonophobia- photophobia. no phonophobia.\par warning/aura-none\par post-drome-none\par nocturnal awakening-none\par association with exertion or Valsalva- none. \par Duration- ranges from 30 minutes to four hours.\par Average #/week- varies. could be every other day and some weeks less.\par Association with menstrual cycle- prior to and after menstrual cycle.\par history of trauma- none\par contraceptive use- Junel \par Triggers-rain, heat, processed sugar, red wine, MS, lack of caffeine \par sleep- inconsistent. could wake up with a headache. light snoring.\par water intake- not enough water.\par caffeine intake- 1 cup or more. \par family history- none\par medications tried for relief- Feverfew (only for a short time), Excedrin.  \par past medications tried- Rizatriptan caused drowsiness, ?rapid heart rate, increased urination. \par Last MRI Brain reported 2016. \par \par The remaining neurological review of systems.\par

## 2021-11-10 NOTE — END OF VISIT
[Time Spent: ___ minutes] : I have spent [unfilled] minutes of time on the encounter. [FreeTextEntry3] : I agree with the findings and plan as documented in the Nurse Practitioner’s note, unless noted below.\par Attesting Faculty: See Attending Electronic Signature Below\par \par

## 2021-11-10 NOTE — PHYSICAL EXAM
[FreeTextEntry1] : Physical examination \par General: No acute distress, Awake, Alert.   \par \par Mental status \par Awake, alert, gives detailed history. \par   \par Cranial Nerves \par II: VFF  \par III, IV, VI: PERRL, EOMI.   \par V: Facial sensation is normal B/L.   \par VII: Facial strength is normal B/L. \par \par VIII: Gross hearing is intact.   \par IX, X: Palate is midline and elevates symmetrically.   \par XI: Trapezius normal strength.   \par XII: Tongue midline without atrophy or fasciculations. \par \par Motor exam  \par Muscle tone - no evidence of rigidity or resistance in all 4 extremities.  \par No atrophy or fasciculations \par Muscle Strength: arms and legs, proximal and distal flexors and extensors are normal \par \par No UE drift.\par \par Reflexes \par All present, normal, and symmetrical.   \par \par \par Plantars right: mute.   \par Plantars left: mute. \par \par Coordination \par Finger to nose: Normal.  \par Heel to shin: Normal.   \par \par \par Sensory \par Intact sensation to vibration and cold.\par \par \par Gait \par Normal including heels, toes, and tandem gait.  \par \par

## 2021-11-10 NOTE — ASSESSMENT
[FreeTextEntry1] : Regina Mtz is a 26 year old woman with migraine without aura. \par \par Preventative medications for migraines discussed. She prefers to only take abortive medications for now and continue Imitrex.\par Start Migrelief. \par \par Snoring-  no NAVA on home sleep study.\par \par Follow up in 6 weeks. \par \par Case discussed with Dr. Villanueva.\par \par I discussed in detail with the patient the diagnosis, prognosis, treatment plan and answered all of their questions.\par \par

## 2021-12-22 ENCOUNTER — APPOINTMENT (OUTPATIENT)
Dept: NEUROLOGY | Facility: CLINIC | Age: 26
End: 2021-12-22

## 2022-03-07 ENCOUNTER — RX RENEWAL (OUTPATIENT)
Age: 27
End: 2022-03-07

## 2022-03-08 ENCOUNTER — APPOINTMENT (OUTPATIENT)
Dept: NEUROLOGY | Facility: CLINIC | Age: 27
End: 2022-03-08
Payer: MEDICAID

## 2022-03-08 ENCOUNTER — NON-APPOINTMENT (OUTPATIENT)
Age: 27
End: 2022-03-08

## 2022-03-08 VITALS
OXYGEN SATURATION: 99 % | TEMPERATURE: 98.2 F | HEIGHT: 62 IN | BODY MASS INDEX: 29.44 KG/M2 | WEIGHT: 160 LBS | HEART RATE: 89 BPM | DIASTOLIC BLOOD PRESSURE: 76 MMHG | SYSTOLIC BLOOD PRESSURE: 110 MMHG

## 2022-03-08 DIAGNOSIS — G43.909 MIGRAINE, UNSPECIFIED, NOT INTRACTABLE, W/OUT STATUS MIGRAINOSUS: ICD-10-CM

## 2022-03-08 PROCEDURE — 99215 OFFICE O/P EST HI 40 MIN: CPT

## 2022-03-08 NOTE — PHYSICAL EXAM
[FreeTextEntry1] : Physical examination \par General: No acute distress, Awake, Alert.   \par \par Mental status \par Awake, alert, gives detailed history. \par   \par Cranial Nerves \par II: VFF  \par III, IV, VI: PERRL, EOMI.   \par V: Facial sensation is normal B/L.   \par VII: Facial strength is normal B/L. \par VIII: Gross hearing is intact.   \par IX, X: Palate is midline and elevates symmetrically.   \par XI: Trapezius normal strength.   \par XII: Tongue midline without atrophy or fasciculations. \par \par Motor exam  \par Muscle tone - no evidence of rigidity or resistance in all 4 extremities.  \par No atrophy or fasciculations \par Muscle Strength: arms and legs, proximal and distal flexors and extensors are normal \par No UE drift.\par \par Reflexes \par All present, normal, and symmetrical.   \par Plantars right: mute.   \par Plantars left: mute. \par \par Coordination \par Finger to nose: Normal.  \par Heel to shin: Normal.   \par \par Gait \par Normal \par \par

## 2022-03-08 NOTE — CONSULT LETTER
[Dear  ___] : Dear ~TAVO, [Consult Letter:] : I had the pleasure of evaluating your patient, [unfilled]. [Please see my note below.] : Please see my note below. [FreeTextEntry1] : \par  [FreeTextEntry3] : Sincerely,\par \par Pierre Mckeon M.D.\par

## 2022-03-08 NOTE — END OF VISIT
[FreeTextEntry3] : I agree with the findings and plan as documented in the Nurse Practitioner’s note, unless noted below.\par Attesting Faculty: See Attending Electronic Signature Below\par \par  [Time Spent: ___ minutes] : I have spent [unfilled] minutes of time on the encounter.

## 2022-03-08 NOTE — REVIEW OF SYSTEMS
[Migraine Headache] : migraine headaches [Negative] : Heme/Lymph [Fever] : no fever [Chills] : no chills [Feeling Tired] : not feeling tired [Numbness] : no numbness [Tingling] : no tingling [Difficulty Walking] : no difficulty walking [Inability to Walk] : able to walk [Anxiety] : no anxiety [Depression] : no depression [Red Eyes] : eyes not red [Loss Of Hearing] : no hearing loss [Chest Pain] : no chest pain [Palpitations] : no palpitations [Abdominal Pain] : no abdominal pain [Vomiting] : no vomiting [Constipation] : no constipation [Diarrhea] : no diarrhea [Incontinence] : no incontinence [Pelvic Pain] : no pelvic pain [Itching] : no itching [Muscle Weakness] : no muscle weakness [Easy Bleeding] : no tendency for easy bleeding [Easy Bruising] : no tendency for easy bruising [Swollen Glands] : no swollen glands

## 2022-03-08 NOTE — HISTORY OF PRESENT ILLNESS
[FreeTextEntry1] : Regina Mtz is a 26 year old woman with a history of migraines presenting for a follow up appointment for headaches. \par \par Regina is now establishing follow-up with me.  She has a history of migraine headaches without aura.  Her headache tend to be unilateral but can become bilateral.  They can settle above the eye or behind the eye.  They are either frontotemporal in location or frontoparietal in location.  She has nausea but no vomiting.  She has light and sound sensitivity.  Prior to her headache she feels that there is going to be a pressure or pain type of a symptom.  Once the headache starts it can last hours.  If she takes the sumatriptan quickly enough the headache will resolve.  She has mild palpitations with the sumatriptan but it typically works 80% of the time.  At present she is taking 2-4 sumatriptan hands per month.  She is averaging 1-2 headaches per month.\par \par Triggers include wine and alcohol.  There can be a hormonal component as headaches can occur around her..  She notes weather can be a trigger with storms and also heat.  Caffeine withdrawal and inconsistent sleep is also a trigger.\par \par She sleeps at 1 AM and depending on the day typically wakes up at 11 AM.  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 MCKEON/MEENAKSHI RECINOS NP\par \par She missed two days of work and one day she left school early due to the headaches. She describes a fuzziness, possible increased heart rate and urination after taking Sumatriptan. Denies photophobia. Her headaches increased with the change in weather initially. She reports relief with one dose of Sumatriptan and Excedrin. She endorses one headache this week-on Tuesday - took one dose of Sumatriptan with relief. \par \par The remaining neurological review of systems is negative. \par \par 8/16/21\par Regina Mtz is a 26 year old woman with a history of migraines presenting for a consultation for headaches.\par \par Headache\par Age of onset-many years ago. Increased in frequency when she started college in April. \par location-left temporal. sometimes right temporal. \par description-pounding. could interfere with her daily activities. she will need to take Excedrin and sleep for relief. \par unilateral or bilateral-unilateral. \par Quality-pounding. ranging in intensity.\par Nausea or vomiting-nausea. no vomiting.\par Photophobia or phonophobia- photophobia. no phonophobia.\par warning/aura-none\par post-drome-none\par nocturnal awakening-none\par association with exertion or Valsalva- none. \par Duration- ranges from 30 minutes to four hours.\par Average #/week- varies. could be every other day and some weeks less.\par Association with menstrual cycle- prior to and after menstrual cycle.\par history of trauma- none\par contraceptive use- Junel \par Triggers-rain, heat, processed sugar, red wine, MS, lack of caffeine \par sleep- inconsistent. could wake up with a headache. light snoring.\par water intake- not enough water.\par caffeine intake- 1 cup or more. \par family history- none\par medications tried for relief- Feverfew (only for a short time), Excedrin.  \par past medications tried- Rizatriptan caused drowsiness, ?rapid heart rate, increased urination. \par Last MRI Brain reported 2016. \par \par The remaining neurological review of systems.\par

## 2022-03-08 NOTE — ASSESSMENT
[FreeTextEntry1] : Regina Mtz is a 26 year old woman with migraine without aura. \par \par Her headaches are not frequent enough to warrant a preventative.  However, she did start Migrelief. \par \par \par Advised her to keep a headache diary.\par Increase water intake to 64 ounces daily.\par Encouraged daily exercise.\par \par For rescue, she can take sumatriptan along with 2 ibuprofens (400 mg) with or without half a cup of coffee.  She may repeat the sumatriptan by itself in 2 hours if headaches are not relieved.\par \par Follow-up in 6 months.\par

## 2022-04-26 ENCOUNTER — APPOINTMENT (OUTPATIENT)
Dept: FAMILY MEDICINE | Facility: CLINIC | Age: 27
End: 2022-04-26
Payer: MEDICAID

## 2022-04-26 VITALS
WEIGHT: 185 LBS | TEMPERATURE: 99.6 F | HEIGHT: 62 IN | RESPIRATION RATE: 18 BRPM | OXYGEN SATURATION: 96 % | HEART RATE: 78 BPM | BODY MASS INDEX: 34.04 KG/M2 | DIASTOLIC BLOOD PRESSURE: 70 MMHG | SYSTOLIC BLOOD PRESSURE: 104 MMHG

## 2022-04-26 DIAGNOSIS — H60.542 ACUTE ECZEMATOID OTITIS EXTERNA, LEFT EAR: ICD-10-CM

## 2022-04-26 DIAGNOSIS — M77.41 METATARSALGIA, RIGHT FOOT: ICD-10-CM

## 2022-04-26 PROCEDURE — 99213 OFFICE O/P EST LOW 20 MIN: CPT

## 2022-04-26 NOTE — HISTORY OF PRESENT ILLNESS
[FreeTextEntry8] : 26 year old female presenting right foot pain for last 2 months with burning sensation. Patient reports pain is worse with walking and better at rest. She has been wearing the same foot wear (sneaker). Has not tried any medication for the pain. Patient also complaints of recurrent ear eczema and would like mometasone refilled for her. She is otherwise well.

## 2022-04-26 NOTE — PHYSICAL EXAM
[Normal] : no rash [de-identified] : mild wax on the right side, some scaly skin  [de-identified] : tenderness under the metatarsal of the big toe

## 2022-05-17 ENCOUNTER — RX RENEWAL (OUTPATIENT)
Age: 27
End: 2022-05-17

## 2022-06-16 ENCOUNTER — APPOINTMENT (OUTPATIENT)
Dept: NEUROLOGY | Facility: CLINIC | Age: 27
End: 2022-06-16
Payer: MEDICAID

## 2022-06-16 VITALS
HEIGHT: 62 IN | HEART RATE: 93 BPM | WEIGHT: 180 LBS | DIASTOLIC BLOOD PRESSURE: 84 MMHG | OXYGEN SATURATION: 96 % | SYSTOLIC BLOOD PRESSURE: 116 MMHG | BODY MASS INDEX: 33.13 KG/M2 | TEMPERATURE: 98.2 F

## 2022-06-16 DIAGNOSIS — M54.2 CERVICALGIA: ICD-10-CM

## 2022-06-16 PROCEDURE — 99214 OFFICE O/P EST MOD 30 MIN: CPT

## 2022-06-16 RX ORDER — B2/MAGNESIUM CIT,OXID/FEVERFEW 200-180-50
TABLET ORAL
Refills: 0 | Status: ACTIVE | COMMUNITY

## 2022-06-16 NOTE — PHYSICAL EXAM
[FreeTextEntry1] : Physical examination \par General: No acute distress, Awake, Alert.   \par other: trapezius spasm.\par limited rotation on extension\par \par Mental status \par Awake, alert, gives detailed history. \par   \par Cranial Nerves \par II: VFF  \par III, IV, VI: PERRL, EOMI.   \par V: Facial sensation is normal B/L.   \par VII: Facial strength is normal B/L. \par VIII: Gross hearing is intact.   \par IX, X: Palate is midline and elevates symmetrically.   \par XI: Trapezius normal strength.   \par XII: Tongue midline without atrophy or fasciculations. \par \par Motor exam  \par Muscle tone - no evidence of rigidity or resistance in all 4 extremities.  \par No atrophy or fasciculations \par Muscle Strength: arms and legs, proximal and distal flexors and extensors are normal \par No UE drift.\par \par Reflexes \par All present, normal, and symmetrical.   \par Plantars right: mute.   \par Plantars left: mute. \par \par Coordination \par Finger to nose: Normal.  \par Heel to shin: Normal.   \par \par Gait \par Normal \par \par

## 2022-06-16 NOTE — CONSULT LETTER
[Dear  ___] : Dear  [unfilled], [Consult Letter:] : I had the pleasure of evaluating your patient, [unfilled]. [Please see my note below.] : Please see my note below. [Consult Closing:] : Thank you very much for allowing me to participate in the care of this patient.  If you have any questions, please do not hesitate to contact me. [Sincerely,] : Sincerely, [FreeTextEntry3] : Talia Bazan NYessicaP.\par

## 2022-06-16 NOTE — ASSESSMENT
[FreeTextEntry1] : Regina Mtz is a 27 year old woman with migraine without aura. \par \par Her headaches have become more frequent and now require a preventative.\par Continue Migrelief. \par Start Topamax with upward titration to 75mg daily.\par she is currently on Lexapro and has been maintained on for many years. If Topamax not effective can consider increasing Lexapro dose or switching to Amitriptyline or a trial of Lamictal. \par \par I counseled the patient regarding the fact that the medications that I prescribed are not safe for a fetus. She is currently using reliable prophylaxis. She understands that if she is sexually active without using reliable prophylaxis she cannot take these medications\par \par Neck pain- PT referral. \par Possible cervicogenic component to headaches.\par Heat 20 minutes three times daily PRN\par TENS unit\par Advised her to keep a headache diary.\par Increase water intake to 64 ounces daily.\par Encouraged daily exercise.\par \par For rescue, she can take sumatriptan along with 2 ibuprofens (400 mg) with or without half a cup of coffee.  She may repeat the sumatriptan by itself in 2 hours if headaches are not relieved.\par \par Follow-up as scheduled with Dr. Mckeon in August.

## 2022-06-16 NOTE — HISTORY OF PRESENT ILLNESS
[FreeTextEntry1] : History obtained from patient and mother.\par \par Regina Mtz is a 27 year old woman with a history of migraines presenting for a follow up appointment for an increase in frequency of headaches.\par \par She is averaging two headaches per week in multiple different area (frontotemporal, frontoparietal, and occipital area) that could last all day without the Sumatriptan with nausea occasionally, denies vomiting. She has relief of pain after taking one tablet of Sumatriptan with Ibuprofen 400mg. \par \dragan Does not drink 64 ounces of water daily. \par \par She has non radiating neck pain and sometimes has headaches in the occipital area. \par \par She is not sure if she grinds her teeth, reports occasional jaw pain. \par \par Current medications:\par Concerta\par Junel\par Lexapro\par Sumatriptan\par Migrelief. \par \par The remaining neurological review of systems is negative.\par \par 3/8/22 Dr. Mckeon HPI\par \par "Regina Mtz is a 26 year old woman with a history of migraines presenting for a follow up appointment for headaches. \par \par Regina is now establishing follow-up with me.  She has a history of migraine headaches without aura.  Her headache tend to be unilateral but can become bilateral.  They can settle above the eye or behind the eye.  They are either frontotemporal in location or frontoparietal in location.  She has nausea but no vomiting.  She has light and sound sensitivity.  Prior to her headache she feels that there is going to be a pressure or pain type of a symptom.  Once the headache starts it can last hours.  If she takes the sumatriptan quickly enough the headache will resolve.  She has mild palpitations with the sumatriptan but it typically works 80% of the time.  At present she is taking 2-4 sumatriptan hands per month.  She is averaging 1-2 headaches per month.\par \par Triggers include wine and alcohol.  There can be a hormonal component as headaches can occur around her..  She notes weather can be a trigger with storms and also heat.  Caffeine withdrawal and inconsistent sleep is also a trigger.\par \par She sleeps at 1 AM and depending on the day typically wakes up at 11 AM.  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 MCKEON/MEENAKSHI RECINOS NP\par \par She missed two days of work and one day she left school early due to the headaches. She describes a fuzziness, possible increased heart rate and urination after taking Sumatriptan. Denies photophobia. Her headaches increased with the change in weather initially. She reports relief with one dose of Sumatriptan and Excedrin. She endorses one headache this week-on Tuesday - took one dose of Sumatriptan with relief. \par \par The remaining neurological review of systems is negative. \par \par 8/16/21\par Regina Mtz is a 26 year old woman with a history of migraines presenting for a consultation for headaches.\par \par Headache\par Age of onset-many years ago. Increased in frequency when she started college in April. \par location-left temporal. sometimes right temporal. \par description-pounding. could interfere with her daily activities. she will need to take Excedrin and sleep for relief. \par unilateral or bilateral-unilateral. \par Quality-pounding. ranging in intensity.\par Nausea or vomiting-nausea. no vomiting.\par Photophobia or phonophobia- photophobia. no phonophobia.\par warning/aura-none\par post-drome-none\par nocturnal awakening-none\par association with exertion or Valsalva- none. \par Duration- ranges from 30 minutes to four hours.\par Average #/week- varies. could be every other day and some weeks less.\par Association with menstrual cycle- prior to and after menstrual cycle.\par history of trauma- none\par contraceptive use- Junel \par Triggers-rain, heat, processed sugar, red wine, MS, lack of caffeine \par sleep- inconsistent. could wake up with a headache. light snoring.\par water intake- not enough water.\par caffeine intake- 1 cup or more. \par family history- none\par medications tried for relief- Feverfew (only for a short time), Excedrin.  \par past medications tried- Rizatriptan caused drowsiness, ?rapid heart rate, increased urination. \par Last MRI Brain reported 2016. \par \par The remaining neurological review of systems.\par

## 2022-08-09 ENCOUNTER — APPOINTMENT (OUTPATIENT)
Dept: NEUROLOGY | Facility: CLINIC | Age: 27
End: 2022-08-09

## 2022-08-09 VITALS
BODY MASS INDEX: 33.13 KG/M2 | WEIGHT: 180 LBS | OXYGEN SATURATION: 95 % | TEMPERATURE: 98.7 F | SYSTOLIC BLOOD PRESSURE: 111 MMHG | HEIGHT: 62 IN | DIASTOLIC BLOOD PRESSURE: 78 MMHG | HEART RATE: 98 BPM

## 2022-08-09 PROCEDURE — 99214 OFFICE O/P EST MOD 30 MIN: CPT

## 2022-08-09 RX ORDER — MOMETASONE FUROATE 1 MG/G
0.1 CREAM TOPICAL DAILY
Qty: 1 | Refills: 0 | Status: DISCONTINUED | COMMUNITY
Start: 2022-04-26 | End: 2022-08-09

## 2022-08-15 NOTE — HISTORY OF PRESENT ILLNESS
[FreeTextEntry1] : Headaches better - 80% improvement\par Topamax 50 mg at bedtime \par \par symptoms related to migraine but\par now jaw pain and tooth pain and ear pain \par has not seen the dentist\par \par feels very fatigued and foggy\par had growing pains  (pain in her shins at ankles and knees)- this is not new.\par home sleep study normal \par \par 6/16/22\par History obtained from patient and mother.\par \par Regina Mtz is a 27 year old woman with a history of migraines presenting for a follow up appointment for an increase in frequency of headaches.\par \par She is averaging two headaches per week in multiple different area (frontotemporal, frontoparietal, and occipital area) that could last all day without the Sumatriptan with nausea occasionally, denies vomiting. She has relief of pain after taking one tablet of Sumatriptan with Ibuprofen 400mg. \par \par Does not drink 64 ounces of water daily. \par \par She has non radiating neck pain and sometimes has headaches in the occipital area. \par \par She is not sure if she grinds her teeth, reports occasional jaw pain. \par \par Current medications:\par Concerta\par Junel\par Lexapro\par Sumatriptan\par Migrelief. \par \par The remaining neurological review of systems is negative.\par \par 3/8/22 Dr. Mckeon HPI\par \par "Regina Mtz is a 26 year old woman with a history of migraines presenting for a follow up appointment for headaches. \par \par Regina is now establishing follow-up with me.  She has a history of migraine headaches without aura.  Her headache tend to be unilateral but can become bilateral.  They can settle above the eye or behind the eye.  They are either frontotemporal in location or frontoparietal in location.  She has nausea but no vomiting.  She has light and sound sensitivity.  Prior to her headache she feels that there is going to be a pressure or pain type of a symptom.  Once the headache starts it can last hours.  If she takes the sumatriptan quickly enough the headache will resolve.  She has mild palpitations with the sumatriptan but it typically works 80% of the time.  At present she is taking 2-4 sumatriptan hands per month.  She is averaging 1-2 headaches per month.\par \par Triggers include wine and alcohol.  There can be a hormonal component as headaches can occur around her..  She notes weather can be a trigger with storms and also heat.  Caffeine withdrawal and inconsistent sleep is also a trigger.\par \par She sleeps at 1 AM and depending on the day typically wakes up at 11 AM.  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 MCKEON/MEENAKSHI RECINOS NP\par \par She missed two days of work and one day she left school early due to the headaches. She describes a fuzziness, possible increased heart rate and urination after taking Sumatriptan. Denies photophobia. Her headaches increased with the change in weather initially. She reports relief with one dose of Sumatriptan and Excedrin. She endorses one headache this week-on Tuesday - took one dose of Sumatriptan with relief. \par \par The remaining neurological review of systems is negative. \par \par 8/16/21\par Regina Mtz is a 26 year old woman with a history of migraines presenting for a consultation for headaches.\par \par Headache\par Age of onset-many years ago. Increased in frequency when she started college in April. \par location-left temporal. sometimes right temporal. \par description-pounding. could interfere with her daily activities. she will need to take Excedrin and sleep for relief. \par unilateral or bilateral-unilateral. \par Quality-pounding. ranging in intensity.\par Nausea or vomiting-nausea. no vomiting.\par Photophobia or phonophobia- photophobia. no phonophobia.\par warning/aura-none\par post-drome-none\par nocturnal awakening-none\par association with exertion or Valsalva- none. \par Duration- ranges from 30 minutes to four hours.\par Average #/week- varies. could be every other day and some weeks less.\par Association with menstrual cycle- prior to and after menstrual cycle.\par history of trauma- none\par contraceptive use- Junel \par Triggers-rain, heat, processed sugar, red wine, MS, lack of caffeine \par sleep- inconsistent. could wake up with a headache. light snoring.\par water intake- not enough water.\par caffeine intake- 1 cup or more. \par family history- none\par medications tried for relief- Feverfew (only for a short time), Excedrin.  \par past medications tried- Rizatriptan caused drowsiness, ?rapid heart rate, increased urination. \par Last MRI Brain reported 2016. \par \par The remaining neurological review of systems.\par

## 2022-08-15 NOTE — ASSESSMENT
[FreeTextEntry1] : Regina Mtz is a 27 year old woman with migraine without aura. \par She notes 80% relief with topiramate.\par I recommend she continue this.\par Continue Migrelief. \par \par I counseled the patient regarding the fact that the medications that I prescribed are not safe for a fetus. She is currently using reliable prophylaxis. She understands that if she is sexually active without using reliable prophylaxis she cannot take these medications\par \par she is currently on Lexapro and has been maintained on for many years. If Topamax not effective can consider increasing Lexapro dose or switching to Amitriptyline or a trial of Lamictal. \par \par Increase water take - \par Exercising daily - walks daily 20 minutes -I have advised her to increase her exercise amount daily.\par \par She notes a lot of fatigue.  She demonstrates poor sleep hygiene.  We discussed about going to bed on time and waking up on time.  We discussed avoiding stimulating triggers at bedtime.  I think this will be very beneficial for her overall.\par \par Advised her to see a dentist regarding her tooth ache and jaw ache.\par \par Maintain headache diary.\par \par For rescue, she can take sumatriptan along with 2 ibuprofens (400 mg) with or without half a cup of coffee.  She may repeat the sumatriptan by itself in 2 hours if headaches are not relieved.

## 2022-08-15 NOTE — PHYSICAL EXAM
[FreeTextEntry1] : Physical examination \par General: No acute distress, Awake, Alert.   \par other: trapezius spasm.\par \par Mental status \par Awake, alert, gives detailed history. \par   \par Cranial Nerves \par II: VFF  \par III, IV, VI: PERRL, EOMI.   \par V: Facial sensation is normal B/L.   \par VII: Facial strength is normal B/L. \par VIII: Gross hearing is intact.   \par IX, X: Palate is midline and elevates symmetrically.   \par XI: Trapezius normal strength.   \par XII: Tongue midline without atrophy or fasciculations. \par \par Motor exam  \par Muscle tone - no evidence of rigidity or resistance in all 4 extremities.  \par No atrophy or fasciculations \par Muscle Strength: arms and legs, proximal and distal flexors and extensors are normal \par No UE drift.\par \par Reflexes \par All present, normal, and symmetrical.   \par Plantars right: mute.   \par Plantars left: mute. \par \par Coordination \par Finger to nose: Normal.  \par Heel to shin: Normal.   \par \par Gait \par Normal \par \par

## 2022-12-09 ENCOUNTER — APPOINTMENT (OUTPATIENT)
Dept: NEUROLOGY | Facility: CLINIC | Age: 27
End: 2022-12-09

## 2022-12-09 VITALS
BODY MASS INDEX: 33.13 KG/M2 | DIASTOLIC BLOOD PRESSURE: 77 MMHG | SYSTOLIC BLOOD PRESSURE: 114 MMHG | WEIGHT: 180 LBS | HEART RATE: 105 BPM | HEIGHT: 62 IN

## 2022-12-09 PROCEDURE — 99213 OFFICE O/P EST LOW 20 MIN: CPT

## 2022-12-09 NOTE — ASSESSMENT
[FreeTextEntry1] : Regina Mtz is a 27 year old woman with migraine without aura. \par \par Preventative therapy :\par Continue Migrelief. \par Continue Topamax 50mg nightly. \par she is currently on Lexapro and has been maintained on for many years. If Topamax not effective can consider increasing Lexapro dose or switching to Amitriptyline or a trial of Lamictal. \par \par I counseled the patient regarding the fact that the medications that I prescribed are not safe for a fetus. She is currently using reliable prophylaxis. She understands that if she is sexually active without using reliable prophylaxis she cannot take these medications\par \par Neck pain- completed PT. \par Possible cervicogenic component to headaches.\par Heat 20 minutes three times daily PRN\par TENS unit\par Advised her to keep a headache diary.\par Increase water intake to 64 ounces daily.\par Encouraged daily exercise.\par \par For rescue, she can trial Nurtec. \par Tried: Rizatriptan, Sumatriptan without relief. \par \par Follow-up in 4 months.

## 2022-12-09 NOTE — HISTORY OF PRESENT ILLNESS
[FreeTextEntry1] : Regina Mtz is a 27 year old woman with a history of migraines presenting for a follow up appointment. \par \par She notes Sumatriptan and Rizatriptan make her fatigued and has joint pains in her knees. She is taking Ibuprofen with the Sumatriptan. Had two days of bitemporal and vertex headaches around her menstrual cycle. \par She notes she is having two headache days per month. Currently taking Topamax 50mg without side effects. She tried Topamax 75mg dosage and was unable to toelrate it. She is working on drinking more water. Currently under stress with school and she is finishing up her last class prior to graduation. Feels Lexapro 5mg is working for anxiety. Sleeping 8 hours nightly (goes to bed at 2am) work starts at 2pm. On Migrelief daily. \par  \par The remaining neurological review of systems is negative.  \par \par \par 8/9/22\par HPI Dr. Mckeon \par \par "History obtained from patient and mother.\par \par Regina Mtz is a 27 year old woman with a history of migraines presenting for a follow up appointment for an increase in frequency of headaches.\par \par She is averaging two headaches per week in multiple different area (frontotemporal, frontoparietal, and occipital area) that could last all day without the Sumatriptan with nausea occasionally, denies vomiting. She has relief of pain after taking one tablet of Sumatriptan with Ibuprofen 400mg. \par \par Does not drink 64 ounces of water daily. \par \par She has non radiating neck pain and sometimes has headaches in the occipital area. \par \par She is not sure if she grinds her teeth, reports occasional jaw pain. \par \par Current medications:\par Concerta\par Junel\par Lexapro\par Sumatriptan\par Migrelief. \par \par The remaining neurological review of systems is negative."\par \par 3/8/22 Dr. Mckeon HPI\par \par "Regina Mtz is a 26 year old woman with a history of migraines presenting for a follow up appointment for headaches. \par \par Regina is now establishing follow-up with me.  She has a history of migraine headaches without aura.  Her headache tend to be unilateral but can become bilateral.  They can settle above the eye or behind the eye.  They are either frontotemporal in location or frontoparietal in location.  She has nausea but no vomiting.  She has light and sound sensitivity.  Prior to her headache she feels that there is going to be a pressure or pain type of a symptom.  Once the headache starts it can last hours.  If she takes the sumatriptan quickly enough the headache will resolve.  She has mild palpitations with the sumatriptan but it typically works 80% of the time.  At present she is taking 2-4 sumatriptan hands per month.  She is averaging 1-2 headaches per month.\par \par Triggers include wine and alcohol.  There can be a hormonal component as headaches can occur around her..  She notes weather can be a trigger with storms and also heat.  Caffeine withdrawal and inconsistent sleep is also a trigger.\par \par She sleeps at 1 AM and depending on the day typically wakes up at 11 AM.  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 MCKEON/MEENAKSHI RECINOS NP\par \par She missed two days of work and one day she left school early due to the headaches. She describes a fuzziness, possible increased heart rate and urination after taking Sumatriptan. Denies photophobia. Her headaches increased with the change in weather initially. She reports relief with one dose of Sumatriptan and Excedrin. She endorses one headache this week-on Tuesday - took one dose of Sumatriptan with relief. \par \par The remaining neurological review of systems is negative. \par \par 8/16/21\par Regina Mtz is a 26 year old woman with a history of migraines presenting for a consultation for headaches.\par \par Headache\par Age of onset-many years ago. Increased in frequency when she started college in April. \par location-left temporal. sometimes right temporal. \par description-pounding. could interfere with her daily activities. she will need to take Excedrin and sleep for relief. \par unilateral or bilateral-unilateral. \par Quality-pounding. ranging in intensity.\par Nausea or vomiting-nausea. no vomiting.\par Photophobia or phonophobia- photophobia. no phonophobia.\par warning/aura-none\par post-drome-none\par nocturnal awakening-none\par association with exertion or Valsalva- none. \par Duration- ranges from 30 minutes to four hours.\par Average #/week- varies. could be every other day and some weeks less.\par Association with menstrual cycle- prior to and after menstrual cycle.\par history of trauma- none\par contraceptive use- Junel \par Triggers-rain, heat, processed sugar, red wine, MS, lack of caffeine \par sleep- inconsistent. could wake up with a headache. light snoring.\par water intake- not enough water.\par caffeine intake- 1 cup or more. \par family history- none\par medications tried for relief- Feverfew (only for a short time), Excedrin.  \par past medications tried- Rizatriptan caused drowsiness, ?rapid heart rate, increased urination. \par Last MRI Brain reported 2016. \par \par The remaining neurological review of systems.\par

## 2022-12-27 ENCOUNTER — APPOINTMENT (OUTPATIENT)
Dept: FAMILY MEDICINE | Facility: CLINIC | Age: 27
End: 2022-12-27

## 2022-12-28 ENCOUNTER — APPOINTMENT (OUTPATIENT)
Dept: FAMILY MEDICINE | Facility: CLINIC | Age: 27
End: 2022-12-28
Payer: MEDICAID

## 2022-12-28 VITALS
OXYGEN SATURATION: 95 % | HEIGHT: 62 IN | HEART RATE: 110 BPM | TEMPERATURE: 99 F | BODY MASS INDEX: 33.13 KG/M2 | DIASTOLIC BLOOD PRESSURE: 80 MMHG | WEIGHT: 180 LBS | SYSTOLIC BLOOD PRESSURE: 125 MMHG

## 2022-12-28 DIAGNOSIS — J06.9 ACUTE UPPER RESPIRATORY INFECTION, UNSPECIFIED: ICD-10-CM

## 2022-12-28 PROCEDURE — 99213 OFFICE O/P EST LOW 20 MIN: CPT

## 2022-12-28 RX ORDER — SUMATRIPTAN 50 MG/1
50 TABLET, FILM COATED ORAL
Qty: 12 | Refills: 4 | Status: DISCONTINUED | COMMUNITY
Start: 2021-08-16 | End: 2022-12-28

## 2023-01-02 PROBLEM — J06.9 UPPER RESPIRATORY INFECTION WITH COUGH AND CONGESTION: Status: RESOLVED | Noted: 2022-12-28 | Resolved: 2023-01-27

## 2023-01-02 NOTE — HISTORY OF PRESENT ILLNESS
[FreeTextEntry8] : 27 year old female presenting with complaints of cough and congestion with left ear discomfort. Patient denies fever or chills.

## 2023-03-27 ENCOUNTER — RX RENEWAL (OUTPATIENT)
Age: 28
End: 2023-03-27

## 2023-04-05 ENCOUNTER — APPOINTMENT (OUTPATIENT)
Dept: NEUROLOGY | Facility: CLINIC | Age: 28
End: 2023-04-05
Payer: MEDICAID

## 2023-04-05 VITALS
SYSTOLIC BLOOD PRESSURE: 135 MMHG | WEIGHT: 180 LBS | DIASTOLIC BLOOD PRESSURE: 82 MMHG | BODY MASS INDEX: 33.13 KG/M2 | HEIGHT: 62 IN | HEART RATE: 97 BPM

## 2023-04-05 PROCEDURE — 99212 OFFICE O/P EST SF 10 MIN: CPT

## 2023-04-05 RX ORDER — RIMEGEPANT SULFATE 75 MG/75MG
75 TABLET, ORALLY DISINTEGRATING ORAL
Qty: 16 | Refills: 3 | Status: DISCONTINUED | COMMUNITY
Start: 2022-12-09 | End: 2023-04-05

## 2023-04-05 RX ORDER — AZITHROMYCIN 500 MG/1
500 TABLET, FILM COATED ORAL DAILY
Qty: 1 | Refills: 0 | Status: DISCONTINUED | COMMUNITY
Start: 2022-12-28 | End: 2023-04-05

## 2023-04-05 NOTE — PHYSICAL EXAM
[FreeTextEntry1] : Physical examination \par General: No acute distress, Awake, Alert.   \par other: trapezius spasm.\par  \par other: clicking of TMJ \par \par Mental status \par Awake, alert, gives detailed history. \par   \par Cranial Nerves \par II: VFF  \par III, IV, VI: PERRL, EOMI.   \par V: Facial sensation is normal B/L.   \par VII: Facial strength is normal B/L. \par VIII: Gross hearing is intact.   \par IX, X: Palate is midline and elevates symmetrically.   \par XI: Trapezius normal strength.   \par XII: Tongue midline without atrophy or fasciculations. \par \par Motor exam  \par Muscle tone - no evidence of rigidity or resistance in all 4 extremities.  \par No atrophy or fasciculations \par Muscle Strength: arms and legs, proximal and distal flexors and extensors are normal \par No UE drift.\par \par Reflexes \par All present, normal, and symmetrical.   \par Plantars right: mute.   \par Plantars left: mute. \par \par Coordination \par Finger to nose: Normal.  \par Heel to shin: Normal.   \par \par Gait \par Normal \par \par

## 2023-04-05 NOTE — HISTORY OF PRESENT ILLNESS
[FreeTextEntry1] : Regina Mtz is a 27 year old woman with a history of migraines presenting for a follow up appointment. \par \par Having one headache every few weeks lasting two hours. Sumatriptan  mg will relieve the pain. Had a mild headache every other day a few months ago that has now resolved. Currently on Lexapro 10 mg and Topamax 50 mg without side effects. She also takes Migrelief daily. Notes sleep difficulties related to her ADHD. Stress level is better now that she completed school \par \par The remaining neurological review of systems is negative. \par \par \par 12/9/22 \par Regina Mtz is a 27 year old woman with a history of migraines presenting for a follow up appointment. \par \par She notes Sumatriptan and Rizatriptan make her fatigued and has joint pains in her knees. She is taking Ibuprofen with the Sumatriptan. Had two days of bitemporal and vertex headaches around her menstrual cycle. \par She notes she is having two headache days per month. Currently taking Topamax 50mg without side effects. She tried Topamax 75mg dosage and was unable to toelrate it. She is working on drinking more water. Currently under stress with school and she is finishing up her last class prior to graduation. Feels Lexapro 5mg is working for anxiety. Sleeping 8 hours nightly (goes to bed at 2am) work starts at 2pm. On Migrelief daily. \par  \par The remaining neurological review of systems is negative.  \par \par \par 8/9/22\par HPI Dr. Mckeon \par \par "History obtained from patient and mother.\par \par Regina Mtz is a 27 year old woman with a history of migraines presenting for a follow up appointment for an increase in frequency of headaches.\par \par She is averaging two headaches per week in multiple different area (frontotemporal, frontoparietal, and occipital area) that could last all day without the Sumatriptan with nausea occasionally, denies vomiting. She has relief of pain after taking one tablet of Sumatriptan with Ibuprofen 400mg. \par \par Does not drink 64 ounces of water daily. \par \par She has non radiating neck pain and sometimes has headaches in the occipital area. \par \par She is not sure if she grinds her teeth, reports occasional jaw pain. \par \par Current medications:\par Concerta\par Junel\par Lexapro\par Sumatriptan\par Migrelief. \par \par The remaining neurological review of systems is negative."\par \par 3/8/22 Dr. Mckeon HPI\par \par "Regina Mtz is a 26 year old woman with a history of migraines presenting for a follow up appointment for headaches. \par \par Regina is now establishing follow-up with me.  She has a history of migraine headaches without aura.  Her headache tend to be unilateral but can become bilateral.  They can settle above the eye or behind the eye.  They are either frontotemporal in location or frontoparietal in location.  She has nausea but no vomiting.  She has light and sound sensitivity.  Prior to her headache she feels that there is going to be a pressure or pain type of a symptom.  Once the headache starts it can last hours.  If she takes the sumatriptan quickly enough the headache will resolve.  She has mild palpitations with the sumatriptan but it typically works 80% of the time.  At present she is taking 2-4 sumatriptan hands per month.  She is averaging 1-2 headaches per month.\par \par Triggers include wine and alcohol.  There can be a hormonal component as headaches can occur around her..  She notes weather can be a trigger with storms and also heat.  Caffeine withdrawal and inconsistent sleep is also a trigger.\par \par She sleeps at 1 AM and depending on the day typically wakes up at 11 AM.  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 MCKEON/MEENAKSHI RECINOS NP\par \par She missed two days of work and one day she left school early due to the headaches. She describes a fuzziness, possible increased heart rate and urination after taking Sumatriptan. Denies photophobia. Her headaches increased with the change in weather initially. She reports relief with one dose of Sumatriptan and Excedrin. She endorses one headache this week-on Tuesday - took one dose of Sumatriptan with relief. \par \par The remaining neurological review of systems is negative. \par \par 8/16/21\par Regina Mtz is a 26 year old woman with a history of migraines presenting for a consultation for headaches.\par \par Headache\par Age of onset-many years ago. Increased in frequency when she started college in April. \par location-left temporal. sometimes right temporal. \par description-pounding. could interfere with her daily activities. she will need to take Excedrin and sleep for relief. \par unilateral or bilateral-unilateral. \par Quality-pounding. ranging in intensity.\par Nausea or vomiting-nausea. no vomiting.\par Photophobia or phonophobia- photophobia. no phonophobia.\par warning/aura-none\par post-drome-none\par nocturnal awakening-none\par association with exertion or Valsalva- none. \par Duration- ranges from 30 minutes to four hours.\par Average #/week- varies. could be every other day and some weeks less.\par Association with menstrual cycle- prior to and after menstrual cycle.\par history of trauma- none\par contraceptive use- Junel \par Triggers-rain, heat, processed sugar, red wine, MS, lack of caffeine \par sleep- inconsistent. could wake up with a headache. light snoring.\par water intake- not enough water.\par caffeine intake- 1 cup or more. \par family history- none\par medications tried for relief- Feverfew (only for a short time), Excedrin.  \par past medications tried- Rizatriptan caused drowsiness, ?rapid heart rate, increased urination. \par Last MRI Brain reported 2016. \par \par The remaining neurological review of systems.\par

## 2023-04-05 NOTE — ASSESSMENT
[FreeTextEntry1] : Regina Mtz is a 27 year old woman with migraine without aura. \par \par Preventative therapy :\par Continue Migrelief. \par Continue Topamax 50mg nightly. \par she is currently on Lexapro and has been maintained on for many years. If Topamax not effective can consider increasing Lexapro dose or switching to Amitriptyline or a trial of Lamictal. \par \par I counseled the patient regarding the fact that the medications that I prescribed are not safe for a fetus. She is currently using reliable prophylaxis. She understands that if she is sexually active without using reliable prophylaxis she cannot take these medications\par \par Neck pain- completed PT. \par Possible cervicogenic component to headaches.\par Heat 20 minutes three times daily PRN\par TENS unit\par Advised her to keep a headache diary.\par Increase water intake to 64 ounces daily.\par Encouraged daily exercise.\par \par ?TMJ component- follow up with dentist. \par \par For rescue, she tried Nurtec that was not effective.\par Tried: Rizatriptan, Nurtec \par continue Sumatriptan PRN with Ibuprofen. \par \par Follow-up in 4 months and resume care with Dr. Mckeon.

## 2023-06-14 ENCOUNTER — RX RENEWAL (OUTPATIENT)
Age: 28
End: 2023-06-14

## 2023-06-29 ENCOUNTER — APPOINTMENT (OUTPATIENT)
Dept: FAMILY MEDICINE | Facility: CLINIC | Age: 28
End: 2023-06-29
Payer: MEDICAID

## 2023-06-29 VITALS
WEIGHT: 180 LBS | SYSTOLIC BLOOD PRESSURE: 116 MMHG | OXYGEN SATURATION: 99 % | HEART RATE: 96 BPM | TEMPERATURE: 100.5 F | BODY MASS INDEX: 33.13 KG/M2 | HEIGHT: 62 IN | DIASTOLIC BLOOD PRESSURE: 70 MMHG

## 2023-06-29 PROCEDURE — 99214 OFFICE O/P EST MOD 30 MIN: CPT

## 2023-06-29 RX ORDER — METHYLPHENIDATE HYDROCHLORIDE 27 MG/1
27 TABLET, EXTENDED RELEASE ORAL
Qty: 30 | Refills: 0 | Status: DISCONTINUED | COMMUNITY
Start: 2022-11-13 | End: 2023-06-29

## 2023-06-29 RX ORDER — ESCITALOPRAM OXALATE 5 MG/1
5 TABLET, FILM COATED ORAL
Refills: 0 | Status: DISCONTINUED | COMMUNITY
End: 2023-06-29

## 2023-07-05 NOTE — PHYSICAL EXAM
[No Varicosities] : no varicosities [Pedal Pulses Present] : the pedal pulses are present [Coordination Grossly Intact] : coordination grossly intact [No Focal Deficits] : no focal deficits [Normal Gait] : normal gait [Deep Tendon Reflexes (DTR)] : deep tendon reflexes were 2+ and symmetric [Normal] : affect was normal and insight and judgment were intact

## 2023-07-05 NOTE — HISTORY OF PRESENT ILLNESS
[FreeTextEntry8] : 29 yo F, with hx of migraines followed by neurology, presenting for two month hx of chronic bilateral lower extremity aches and pains in distal lower legs. Reports pains start occurring 10-30 mins after walking and sometimes may occur even sooner. Reports an achy burning sensation. Denies any lower extremity swelling, knee pain, previous hx of trauma to area. Denies any night time symptoms. No family hx of rheumatological conditions.

## 2023-07-05 NOTE — PLAN
[FreeTextEntry1] : 29 yo F with unclear etiology of bilatearl lower extremity MSK pain\par - will check labs for vitamin B and D deficiencies\par - low suspicion for rheumatological conditions, no synvoitis present\par - stretching and slow progressive exercise plan discussed\par - all questions answered

## 2023-07-11 ENCOUNTER — APPOINTMENT (OUTPATIENT)
Dept: INTERNAL MEDICINE | Facility: CLINIC | Age: 28
End: 2023-07-11
Payer: MEDICAID

## 2023-07-11 DIAGNOSIS — M25.562 PAIN IN RIGHT KNEE: ICD-10-CM

## 2023-07-11 DIAGNOSIS — M25.561 PAIN IN RIGHT KNEE: ICD-10-CM

## 2023-07-11 PROCEDURE — 36415 COLL VENOUS BLD VENIPUNCTURE: CPT

## 2023-07-13 LAB
25(OH)D3 SERPL-MCNC: 33 NG/ML
ANA SER IF-ACNC: NEGATIVE
CCP AB SER IA-ACNC: <8 UNITS
CRP SERPL-MCNC: 33 MG/L
DSDNA AB SER-ACNC: <12 IU/ML
ERYTHROCYTE [SEDIMENTATION RATE] IN BLOOD BY WESTERGREN METHOD: 120 MM/HR
FERRITIN SERPL-MCNC: 53 NG/ML
FOLATE SERPL-MCNC: 12 NG/ML
IRON SATN MFR SERPL: 13 %
IRON SERPL-MCNC: 35 UG/DL
MAGNESIUM SERPL-MCNC: 2.1 MG/DL
RF+CCP IGG SER-IMP: NEGATIVE
RHEUMATOID FACT SER QL: <10 IU/ML
TIBC SERPL-MCNC: 268 UG/DL
UIBC SERPL-MCNC: 233 UG/DL
VIT B12 SERPL-MCNC: 730 PG/ML

## 2023-07-17 LAB — VIT B6 SERPL-MCNC: 6.1 UG/L

## 2023-08-02 ENCOUNTER — APPOINTMENT (OUTPATIENT)
Dept: RHEUMATOLOGY | Facility: CLINIC | Age: 28
End: 2023-08-02
Payer: MEDICAID

## 2023-08-02 ENCOUNTER — LABORATORY RESULT (OUTPATIENT)
Age: 28
End: 2023-08-02

## 2023-08-02 VITALS
BODY MASS INDEX: 34.96 KG/M2 | OXYGEN SATURATION: 97 % | HEIGHT: 62 IN | HEART RATE: 84 BPM | DIASTOLIC BLOOD PRESSURE: 74 MMHG | WEIGHT: 190 LBS | SYSTOLIC BLOOD PRESSURE: 118 MMHG

## 2023-08-02 PROCEDURE — 99204 OFFICE O/P NEW MOD 45 MIN: CPT

## 2023-08-02 NOTE — PHYSICAL EXAM
[General Appearance - Alert] : alert [General Appearance - In No Acute Distress] : in no acute distress [Sclera] : the sclera and conjunctiva were normal [Neck Appearance] : the appearance of the neck was normal [Neck Cervical Mass (___cm)] : no neck mass was observed [Auscultation Breath Sounds / Voice Sounds] : lungs were clear to auscultation bilaterally [Heart Sounds] : normal S1 and S2 [Edema] : there was no peripheral edema [Nail Clubbing] : no clubbing  or cyanosis of the fingernails [Musculoskeletal - Swelling] : no joint swelling seen [Motor Tone] : muscle strength and tone were normal [] : no rash [No Focal Deficits] : no focal deficits [Oriented To Time, Place, And Person] : oriented to person, place, and time

## 2023-08-02 NOTE — HISTORY OF PRESENT ILLNESS
[FreeTextEntry1] : 27yo F with PCOS, anxiety, depression in the office for evalaution.  History: presented initially to pcp for evaluation of arthralgais in lower ext progressive since april, episodic wax-wane variable intensity like a deep ache, burning precipitated by activity improved with tylenol workup with   ROS no fevers no chills no skin rashes no synoviits no lower ext weakness no oral ulcers no raynauds no claudication

## 2023-08-03 ENCOUNTER — TRANSCRIPTION ENCOUNTER (OUTPATIENT)
Age: 28
End: 2023-08-03

## 2023-08-05 ENCOUNTER — RX RENEWAL (OUTPATIENT)
Age: 28
End: 2023-08-05

## 2023-08-05 ENCOUNTER — NON-APPOINTMENT (OUTPATIENT)
Age: 28
End: 2023-08-05

## 2023-08-05 LAB
ALBUMIN MFR SERPL ELPH: 47.1 %
ALBUMIN SERPL ELPH-MCNC: 4 G/DL
ALBUMIN SERPL-MCNC: 3.5 G/DL
ALBUMIN/GLOB SERPL: 0.9 RATIO
ALP BLD-CCNC: 115 U/L
ALPHA1 GLOB MFR SERPL ELPH: 5.5 %
ALPHA1 GLOB SERPL ELPH-MCNC: 0.4 G/DL
ALPHA2 GLOB MFR SERPL ELPH: 13.3 %
ALPHA2 GLOB SERPL ELPH-MCNC: 1 G/DL
ALT SERPL-CCNC: 14 U/L
ANION GAP SERPL CALC-SCNC: 14 MMOL/L
AST SERPL-CCNC: 14 U/L
B-GLOBULIN MFR SERPL ELPH: 18.3 %
B-GLOBULIN SERPL ELPH-MCNC: 1.4 G/DL
BILIRUB SERPL-MCNC: 0.2 MG/DL
BUN SERPL-MCNC: 12 MG/DL
C3 SERPL-MCNC: 227 MG/DL
C4 SERPL-MCNC: 34 MG/DL
CALCIUM SERPL-MCNC: 9.6 MG/DL
CHLORIDE SERPL-SCNC: 106 MMOL/L
CO2 SERPL-SCNC: 20 MMOL/L
CREAT SERPL-MCNC: 0.66 MG/DL
DEPRECATED KAPPA LC FREE/LAMBDA SER: 1.53 RATIO
EGFR: 123 ML/MIN/1.73M2
ERYTHROCYTE [SEDIMENTATION RATE] IN BLOOD BY WESTERGREN METHOD: 118 MM/HR
GAMMA GLOB FLD ELPH-MCNC: 1.2 G/DL
GAMMA GLOB MFR SERPL ELPH: 15.8 %
GLUCOSE SERPL-MCNC: 81 MG/DL
IGA SER QL IEP: 555 MG/DL
IGG SER QL IEP: 1239 MG/DL
IGM SER QL IEP: 74 MG/DL
INTERPRETATION SERPL IEP-IMP: NORMAL
KAPPA LC CSF-MCNC: 1.79 MG/DL
KAPPA LC SERPL-MCNC: 2.74 MG/DL
POTASSIUM SERPL-SCNC: 4.5 MMOL/L
PROT SERPL-MCNC: 7.4 G/DL
SODIUM SERPL-SCNC: 141 MMOL/L

## 2023-08-07 ENCOUNTER — RX RENEWAL (OUTPATIENT)
Age: 28
End: 2023-08-07

## 2023-08-07 ENCOUNTER — APPOINTMENT (OUTPATIENT)
Dept: NEUROLOGY | Facility: CLINIC | Age: 28
End: 2023-08-07
Payer: MEDICAID

## 2023-08-07 VITALS
BODY MASS INDEX: 34.96 KG/M2 | SYSTOLIC BLOOD PRESSURE: 129 MMHG | HEIGHT: 62 IN | WEIGHT: 190 LBS | DIASTOLIC BLOOD PRESSURE: 84 MMHG | HEART RATE: 102 BPM

## 2023-08-07 DIAGNOSIS — D50.8 OTHER IRON DEFICIENCY ANEMIAS: ICD-10-CM

## 2023-08-07 DIAGNOSIS — G47.00 INSOMNIA, UNSPECIFIED: ICD-10-CM

## 2023-08-07 PROCEDURE — 99213 OFFICE O/P EST LOW 20 MIN: CPT

## 2023-08-07 RX ORDER — TOPIRAMATE 50 MG/1
50 TABLET, FILM COATED ORAL
Qty: 90 | Refills: 3 | Status: ACTIVE | COMMUNITY
Start: 2022-06-16 | End: 1900-01-01

## 2023-08-10 PROBLEM — G47.00 INSOMNIA, UNSPECIFIED TYPE: Status: ACTIVE | Noted: 2023-08-10

## 2023-08-10 NOTE — ASSESSMENT
[FreeTextEntry1] : Regina Mtz is a 27 year old woman with migraine without aura.   Preventative therapy : Continue Migrelief.  Continue Topamax 50 mg nightly.   Side effects include, but are not limited to, decreased appetite, weight loss, numbness and tingling in the extremities, increased risk of kidney stones, word finding difficulties, and fatigue. I counseled the patient regarding the fact that the medications that I prescribed are not safe for a fetus. She is currently using reliable prophylaxis. She understands that if she is sexually active without using reliable prophylaxis she cannot take these medications  Advised her to keep a headache diary. Increase water intake to 64 ounces daily. Encouraged daily exercise.  For rescue, she tried Nurtec that was not effective. Tried: Rizatriptan, Nurtec  continue Sumatriptan PRN with Ibuprofen.   Discussed sleep hygiene 3/2/1 rule   Follow-up routinely.

## 2023-08-10 NOTE — HISTORY OF PRESENT ILLNESS
[FreeTextEntry1] : 8/7/23 Luiza is here in follow-up.  She notes overall good control of her migraines.  She is tolerating topiramate 50 mg without any side effects. She continues to have problems with sleeping.  She falls asleep and then describes being restless.  She had a breakthrough headache for which she took sumatriptan, ibuprofen and coffee which helped.  This 1 was particularly bad but typically her breakthrough headaches are not severe.  No focal or lateralizing neurologic dysfunction.  4/5/23 Regina Mtz is a 27 year old woman with a history of migraines presenting for a follow up appointment.   Having one headache every few weeks lasting two hours. Sumatriptan  mg will relieve the pain. Had a mild headache every other day a few months ago that has now resolved. Currently on Lexapro 10 mg and Topamax 50 mg without side effects. She also takes Migrelief daily. Notes sleep difficulties related to her ADHD. Stress level is better now that she completed school   The remaining neurological review of systems is negative.    12/9/22  Regina Mtz is a 27 year old woman with a history of migraines presenting for a follow up appointment.   She notes Sumatriptan and Rizatriptan make her fatigued and has joint pains in her knees. She is taking Ibuprofen with the Sumatriptan. Had two days of bitemporal and vertex headaches around her menstrual cycle.  She notes she is having two headache days per month. Currently taking Topamax 50mg without side effects. She tried Topamax 75mg dosage and was unable to toelrate it. She is working on drinking more water. Currently under stress with school and she is finishing up her last class prior to graduation. Feels Lexapro 5mg is working for anxiety. Sleeping 8 hours nightly (goes to bed at 2am) work starts at 2pm. On Migrelief daily.    The remaining neurological review of systems is negative.     8/9/22 HPI Dr. Mckeon   "History obtained from patient and mother.  Regina Mtz is a 27 year old woman with a history of migraines presenting for a follow up appointment for an increase in frequency of headaches.  She is averaging two headaches per week in multiple different area (frontotemporal, frontoparietal, and occipital area) that could last all day without the Sumatriptan with nausea occasionally, denies vomiting. She has relief of pain after taking one tablet of Sumatriptan with Ibuprofen 400mg.   Does not drink 64 ounces of water daily.   She has non radiating neck pain and sometimes has headaches in the occipital area.   She is not sure if she grinds her teeth, reports occasional jaw pain.   Current medications: Concerta Junel Lexapro Sumatriptan Migrelief.   The remaining neurological review of systems is negative."  3/8/22 Dr. Mckeon HPI  "Regina Mtz is a 26 year old woman with a history of migraines presenting for a follow up appointment for headaches.   Regina is now establishing follow-up with me.  She has a history of migraine headaches without aura.  Her headache tend to be unilateral but can become bilateral.  They can settle above the eye or behind the eye.  They are either frontotemporal in location or frontoparietal in location.  She has nausea but no vomiting.  She has light and sound sensitivity.  Prior to her headache she feels that there is going to be a pressure or pain type of a symptom.  Once the headache starts it can last hours.  If she takes the sumatriptan quickly enough the headache will resolve.  She has mild palpitations with the sumatriptan but it typically works 80% of the time.  At present she is taking 2-4 sumatriptan hands per month.  She is averaging 1-2 headaches per month.  Triggers include wine and alcohol.  There can be a hormonal component as headaches can occur around her..  She notes weather can be a trigger with storms and also heat.  Caffeine withdrawal and inconsistent sleep is also a trigger.  She sleeps at 1 AM and depending on the day typically wakes up at 11 AM.  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 Drinks 1 cup of coffee a day. Only drinks 2 cups of water per day.  Does not exercise."  11/10/21 - NOTES FROM DR. MCKEON/MEENAKSHI RECINOS NP  She missed two days of work and one day she left school early due to the headaches. She describes a fuzziness, possible increased heart rate and urination after taking Sumatriptan. Denies photophobia. Her headaches increased with the change in weather initially. She reports relief with one dose of Sumatriptan and Excedrin. She endorses one headache this week-on Tuesday - took one dose of Sumatriptan with relief.   The remaining neurological review of systems is negative.   8/16/21 Regina Mtz is a 26 year old woman with a history of migraines presenting for a consultation for headaches.  Headache Age of onset-many years ago. Increased in frequency when she started college in April.  location-left temporal. sometimes right temporal.  description-pounding. could interfere with her daily activities. she will need to take Excedrin and sleep for relief.  unilateral or bilateral-unilateral.  Quality-pounding. ranging in intensity. Nausea or vomiting-nausea. no vomiting. Photophobia or phonophobia- photophobia. no phonophobia. warning/aura-none post-drome-none nocturnal awakening-none association with exertion or Valsalva- none.  Duration- ranges from 30 minutes to four hours. Average #/week- varies. could be every other day and some weeks less. Association with menstrual cycle- prior to and after menstrual cycle. history of trauma- none contraceptive use- Junel  Triggers-rain, heat, processed sugar, red wine, MS, lack of caffeine  sleep- inconsistent. could wake up with a headache. light snoring. water intake- not enough water. caffeine intake- 1 cup or more.  family history- none medications tried for relief- Feverfew (only for a short time), Excedrin.   past medications tried- Rizatriptan caused drowsiness, ?rapid heart rate, increased urination.  Last MRI Brain reported 2016.   The remaining neurological review of systems.

## 2023-08-10 NOTE — PHYSICAL EXAM
[FreeTextEntry1] : Physical examination  General: No acute distress, Awake, Alert.    other: trapezius spasm.  other: clicking of TMJ   Mental status  Awake, alert, gives detailed history.     Cranial Nerves  II: VFF   III, IV, VI: PERRL, EOMI.    V: Facial sensation is normal B/L.    VII: Facial strength is normal B/L.  VIII: Gross hearing is intact.    IX, X: Palate is midline and elevates symmetrically.    XI: Trapezius normal strength.    XII: Tongue midline without atrophy or fasciculations.   Motor exam   Muscle tone - no evidence of rigidity or resistance in all 4 extremities.   No atrophy or fasciculations  Muscle Strength: arms and legs, proximal and distal flexors and extensors are normal  No UE drift.  Reflexes  All present, normal, and symmetrical.    Plantars right: mute.    Plantars left: mute.   Coordination  Finger to nose: Normal.   Heel to shin: Normal.     Gait  Normal

## 2023-10-06 ENCOUNTER — APPOINTMENT (OUTPATIENT)
Dept: RHEUMATOLOGY | Facility: CLINIC | Age: 28
End: 2023-10-06
Payer: MEDICAID

## 2023-10-06 ENCOUNTER — LABORATORY RESULT (OUTPATIENT)
Age: 28
End: 2023-10-06

## 2023-10-06 ENCOUNTER — APPOINTMENT (OUTPATIENT)
Dept: INTERNAL MEDICINE | Facility: CLINIC | Age: 28
End: 2023-10-06

## 2023-10-06 PROCEDURE — 36415 COLL VENOUS BLD VENIPUNCTURE: CPT

## 2023-10-18 ENCOUNTER — APPOINTMENT (OUTPATIENT)
Age: 28
End: 2023-10-18
Payer: MEDICAID

## 2023-10-18 VITALS
WEIGHT: 190 LBS | BODY MASS INDEX: 34.96 KG/M2 | SYSTOLIC BLOOD PRESSURE: 126 MMHG | HEIGHT: 62 IN | HEART RATE: 85 BPM | OXYGEN SATURATION: 97 % | DIASTOLIC BLOOD PRESSURE: 76 MMHG

## 2023-10-18 PROCEDURE — 99213 OFFICE O/P EST LOW 20 MIN: CPT

## 2023-11-03 ENCOUNTER — APPOINTMENT (OUTPATIENT)
Age: 28
End: 2023-11-03
Payer: MEDICAID

## 2023-11-03 VITALS
BODY MASS INDEX: 34.96 KG/M2 | TEMPERATURE: 96.9 F | HEIGHT: 62 IN | SYSTOLIC BLOOD PRESSURE: 113 MMHG | OXYGEN SATURATION: 98 % | RESPIRATION RATE: 16 BRPM | WEIGHT: 190 LBS | HEART RATE: 83 BPM | DIASTOLIC BLOOD PRESSURE: 76 MMHG

## 2023-11-03 DIAGNOSIS — M79.606 PAIN IN LEG, UNSPECIFIED: ICD-10-CM

## 2023-11-03 DIAGNOSIS — Z83.2 FAMILY HISTORY OF DISEASES OF THE BLOOD AND BLOOD-FORMING ORGANS AND CERTAIN DISORDERS INVOLVING THE IMMUNE MECHANISM: ICD-10-CM

## 2023-11-03 PROCEDURE — 99204 OFFICE O/P NEW MOD 45 MIN: CPT

## 2023-11-03 RX ORDER — METHYLPHENIDATE HYDROCHLORIDE 27 MG/1
27 TABLET, EXTENDED RELEASE ORAL
Refills: 0 | Status: ACTIVE | COMMUNITY

## 2023-11-03 RX ORDER — ESCITALOPRAM OXALATE 20 MG/1
20 TABLET, FILM COATED ORAL
Refills: 0 | Status: ACTIVE | COMMUNITY

## 2023-11-08 ENCOUNTER — TRANSCRIPTION ENCOUNTER (OUTPATIENT)
Age: 28
End: 2023-11-08

## 2023-11-10 ENCOUNTER — TRANSCRIPTION ENCOUNTER (OUTPATIENT)
Age: 28
End: 2023-11-10

## 2023-12-01 ENCOUNTER — APPOINTMENT (OUTPATIENT)
Dept: HEMATOLOGY ONCOLOGY | Facility: CLINIC | Age: 28
End: 2023-12-01
Payer: MEDICAID

## 2023-12-01 VITALS
TEMPERATURE: 96.7 F | BODY MASS INDEX: 35.61 KG/M2 | OXYGEN SATURATION: 98 % | SYSTOLIC BLOOD PRESSURE: 101 MMHG | HEIGHT: 62 IN | WEIGHT: 193.5 LBS | DIASTOLIC BLOOD PRESSURE: 68 MMHG | RESPIRATION RATE: 16 BRPM | HEART RATE: 89 BPM

## 2023-12-01 PROCEDURE — 99214 OFFICE O/P EST MOD 30 MIN: CPT

## 2023-12-15 ENCOUNTER — APPOINTMENT (OUTPATIENT)
Dept: HEMATOLOGY ONCOLOGY | Facility: CLINIC | Age: 28
End: 2023-12-15
Payer: MEDICAID

## 2023-12-15 DIAGNOSIS — R70.0 ELEVATED ERYTHROCYTE SEDIMENTATION RATE: ICD-10-CM

## 2023-12-15 DIAGNOSIS — D50.9 IRON DEFICIENCY ANEMIA, UNSPECIFIED: ICD-10-CM

## 2023-12-15 PROCEDURE — 99213 OFFICE O/P EST LOW 20 MIN: CPT | Mod: 95

## 2023-12-15 NOTE — ASSESSMENT
[FreeTextEntry1] : #Elevated ESR and leukocytosis -due to nonspecific inflammation, no signs of malignancy -not related to thalassemia trait -hemolysis can elevate ESR, however patient's ESR elevation is higher than expected and unlikely to be attributable to thalassemia trait -leukocytosis ,flow cytometry negative  Evelina E, Bayron C, Wolf N, Rusty P, Lien K, Alli O, Harriett S, Cori LIN. Performance evaluation of alternate ESR measurement method using BC-780 automated hematology analyzer: a comparison study with the Westergren reference method. Clin Chem Lab Med. 2023 Sep 22. doi: 10.1515/jikq-[id number]. Epub ahead of print. PMID: 72233335.   #Microcytic anemia -suspect likely related to thalassemia trait -negative hemoglobin electrophoresis, but may have alpha thalassemia, will check smear, unrelated to patient's complaints -Sinhala background -normal osmotic fragility and  haptoglobin  #Leg pain -unclear -elevated IgA but normal serums electrophoresis normal  urine electrophoresis normal electrolytes no findings on exam related to leg pain suspect may be a neuropathy if workup negative,  send back to neurology regarding emg/nerve conduction, biopsy, etc?

## 2023-12-15 NOTE — HISTORY OF PRESENT ILLNESS
[de-identified] : 28 year old female with elevated ESR  Had leg pain, found to have elevated ESR. Went to see PCP had an elevated temperature when patient had her period. Denies other elevated temperatures. Denies weight loss.  Intermittent leg pain, worse with walking, aching and burning. Sometimes at rest, occurs on both legs. Denies trauma. Has migraines. denies visual disturbances. Denies stiffness of joints. Pain 3/10 to 10/10, worse with baths.   Has thalassemia trait Father side is [place] Mother is [place], [place], and [place]  Started methylphenidate started within the last year.  Has had pruritus, neuropathy in hands and feet, denies rashes or pupura  12/1Has palpitations Has achy pain in both legs.  12/15: Telehealth review results

## 2024-02-02 ENCOUNTER — APPOINTMENT (OUTPATIENT)
Dept: FAMILY MEDICINE | Facility: CLINIC | Age: 29
End: 2024-02-02
Payer: MEDICAID

## 2024-02-02 VITALS — HEIGHT: 62 IN | WEIGHT: 191 LBS | BODY MASS INDEX: 35.15 KG/M2

## 2024-02-02 DIAGNOSIS — R00.2 PALPITATIONS: ICD-10-CM

## 2024-02-02 PROCEDURE — 99214 OFFICE O/P EST MOD 30 MIN: CPT | Mod: 25

## 2024-02-02 PROCEDURE — 93000 ELECTROCARDIOGRAM COMPLETE: CPT

## 2024-02-02 PROCEDURE — G2211 COMPLEX E/M VISIT ADD ON: CPT | Mod: NC,1L

## 2024-02-04 PROBLEM — R00.2 PALPITATIONS: Status: ACTIVE | Noted: 2024-02-04

## 2024-02-04 NOTE — HISTORY OF PRESENT ILLNESS
[FreeTextEntry8] : 29 yo F with obesity, ADHD, anxiety, here for evaluation of palpitations. Reports has been present for two months, intermittent, lasting seconds to minutes. Reports feeling tachycardia lasting for a few minutes but flutters and palpitations resolve in few seconds. Denies any diaphoresis, chest pain, shortness of breath. May occur at rest. Taking concerta for ADHD, stable dose, diagnosed with ADHD few years ago per patient.

## 2024-02-04 NOTE — HEALTH RISK ASSESSMENT
[No] : No [No falls in past year] : Patient reported no falls in the past year [0] : 2) Feeling down, depressed, or hopeless: Not at all (0) [Never] : Never [[id number]] : 0

## 2024-02-04 NOTE — PLAN
[FreeTextEntry1] : 27 yo F with feelings of tachycardia, palpitations. EKG reassuring today. Possibly 2/2 due to Concerta, pt to discuss dose change with psych and monitor for symptoms.  Declines lab testing and cardiology referral for holter monitoring at this time.  All questions answered.

## 2024-02-12 ENCOUNTER — APPOINTMENT (OUTPATIENT)
Dept: FAMILY MEDICINE | Facility: CLINIC | Age: 29
End: 2024-02-12

## 2024-03-14 ENCOUNTER — APPOINTMENT (OUTPATIENT)
Dept: FAMILY MEDICINE | Facility: CLINIC | Age: 29
End: 2024-03-14
Payer: MEDICAID

## 2024-03-14 DIAGNOSIS — F90.9 ATTENTION-DEFICIT HYPERACTIVITY DISORDER, UNSPECIFIED TYPE: ICD-10-CM

## 2024-03-14 DIAGNOSIS — U07.1 COVID-19: ICD-10-CM

## 2024-03-14 PROCEDURE — 99443: CPT

## 2024-03-14 PROCEDURE — G2211 COMPLEX E/M VISIT ADD ON: CPT | Mod: NC,1L

## 2024-03-14 NOTE — HISTORY OF PRESENT ILLNESS
[Medical Office: ([facility])___] : at the medical office located in  [Home] : at home, [unfilled] , at the time of the visit. [Verbal consent obtained from patient] : the patient, [unfilled] [FreeTextEntry1] : 29 yo F with hx of ADHD and migraines presenting for telephonic visit after testing positive for COVID with home antigen test. Reports symptoms started 3 days ago and she tested positive two days ago. Reports she has a runny nose, congestion and headaches, but fevers have resolved. Reports also some minor cough.  [Time Spent: ___ minutes] : I have spent [unfilled] minutes with the patient on the telephone

## 2024-03-14 NOTE — PLAN
[FreeTextEntry1] : 29 yo F tested positive for COVID - start paxlovid - guidance provided - home care rememdies discussed - all questions answered - medications reviewed - red flag signs and symptoms discussed

## 2024-04-01 ENCOUNTER — APPOINTMENT (OUTPATIENT)
Dept: NEUROLOGY | Facility: CLINIC | Age: 29
End: 2024-04-01
Payer: MEDICAID

## 2024-04-01 VITALS
SYSTOLIC BLOOD PRESSURE: 114 MMHG | DIASTOLIC BLOOD PRESSURE: 77 MMHG | WEIGHT: 180 LBS | HEIGHT: 62 IN | OXYGEN SATURATION: 98 % | HEART RATE: 84 BPM | BODY MASS INDEX: 33.13 KG/M2

## 2024-04-01 DIAGNOSIS — G43.009 MIGRAINE W/OUT AURA, NOT INTRACTABLE, W/OUT STATUS MIGRAINOSUS: ICD-10-CM

## 2024-04-01 DIAGNOSIS — R11.0 NAUSEA: ICD-10-CM

## 2024-04-01 PROCEDURE — 99214 OFFICE O/P EST MOD 30 MIN: CPT

## 2024-04-01 RX ORDER — ONDANSETRON 4 MG/1
4 TABLET, ORALLY DISINTEGRATING ORAL
Qty: 60 | Refills: 2 | Status: ACTIVE | COMMUNITY
Start: 2024-04-01 | End: 1900-01-01

## 2024-04-01 RX ORDER — NIRMATRELVIR AND RITONAVIR 300-100 MG
20 X 150 MG & KIT ORAL
Qty: 30 | Refills: 0 | Status: DISCONTINUED | COMMUNITY
Start: 2024-03-14 | End: 2024-04-01

## 2024-04-01 RX ORDER — SUMATRIPTAN 50 MG/1
50 TABLET, FILM COATED ORAL
Qty: 12 | Refills: 3 | Status: ACTIVE | COMMUNITY
Start: 1900-01-01 | End: 1900-01-01

## 2024-04-02 PROBLEM — G43.009 MIGRAINE WITHOUT AURA AND WITHOUT STATUS MIGRAINOSUS, NOT INTRACTABLE: Status: ACTIVE | Noted: 2022-03-08

## 2024-04-02 PROBLEM — R11.0 NAUSEA: Status: ACTIVE | Noted: 2024-04-02

## 2024-04-02 NOTE — ASSESSMENT
[FreeTextEntry1] : Regina Mtz is a 27 year old woman with migraine without aura.   Preventative therapy : Continue Migrelief.  Continue Topamax 50 mg nightly.   Side effects include, but are not limited to, decreased appetite, weight loss, numbness and tingling in the extremities, increased risk of kidney stones, word finding difficulties, and fatigue. I counseled the patient regarding the fact that the medications that I prescribed are not safe for a fetus. She is currently using reliable prophylaxis. She understands that if she is sexually active without using reliable prophylaxis she cannot take these medications  Advised her to keep a headache diary. Maintain water intake to 64 ounces daily. Encouraged daily exercise.  For rescue Tried: Rizatriptan, Nurtec  continue Sumatriptan PRN with Ibuprofen.   Discussed sleep hygiene again 3/2/1 rule  discussed need for exercise  Follow-up routinely.

## 2024-04-02 NOTE — HISTORY OF PRESENT ILLNESS
[FreeTextEntry1] : 4/2/24 Has 1-2 headaches per month. Notes nausea is very strong component prior to severe headache.  Tolerating Topamax and Migrelief. No side effects. Nausea significant with headaches Not exercising.  8/7/23 Luiza is here in follow-up.  She notes overall good control of her migraines.  She is tolerating topiramate 50 mg without any side effects. She continues to have problems with sleeping.  She falls asleep and then describes being restless.  She had a breakthrough headache for which she took sumatriptan, ibuprofen and coffee which helped.  This 1 was particularly bad but typically her breakthrough headaches are not severe.  No focal or lateralizing neurologic dysfunction.  4/5/23 Regina Mtz is a 27 year old woman with a history of migraines presenting for a follow up appointment.   Having one headache every few weeks lasting two hours. Sumatriptan  mg will relieve the pain. Had a mild headache every other day a few months ago that has now resolved. Currently on Lexapro 10 mg and Topamax 50 mg without side effects. She also takes Migrelief daily. Notes sleep difficulties related to her ADHD. Stress level is better now that she completed school   The remaining neurological review of systems is negative.    12/9/22  Regina Mtz is a 27 year old woman with a history of migraines presenting for a follow up appointment.   She notes Sumatriptan and Rizatriptan make her fatigued and has joint pains in her knees. She is taking Ibuprofen with the Sumatriptan. Had two days of bitemporal and vertex headaches around her menstrual cycle.  She notes she is having two headache days per month. Currently taking Topamax 50mg without side effects. She tried Topamax 75mg dosage and was unable to toelrate it. She is working on drinking more water. Currently under stress with school and she is finishing up her last class prior to graduation. Feels Lexapro 5mg is working for anxiety. Sleeping 8 hours nightly (goes to bed at 2am) work starts at 2pm. On Migrelief daily.    The remaining neurological review of systems is negative.     8/9/22 HPI Dr. Mckeon   "History obtained from patient and mother.  Regina Mtz is a 27 year old woman with a history of migraines presenting for a follow up appointment for an increase in frequency of headaches.  She is averaging two headaches per week in multiple different area (frontotemporal, frontoparietal, and occipital area) that could last all day without the Sumatriptan with nausea occasionally, denies vomiting. She has relief of pain after taking one tablet of Sumatriptan with Ibuprofen 400mg.   Does not drink 64 ounces of water daily.   She has non radiating neck pain and sometimes has headaches in the occipital area.   She is not sure if she grinds her teeth, reports occasional jaw pain.   Current medications: Concerta Junel Lexapro Sumatriptan Migrelief.   The remaining neurological review of systems is negative."  3/8/22 Dr. Mckeon HPI  "Regina Mtz is a 26 year old woman with a history of migraines presenting for a follow up appointment for headaches.   Regina is now establishing follow-up with me.  She has a history of migraine headaches without aura.  Her headache tend to be unilateral but can become bilateral.  They can settle above the eye or behind the eye.  They are either frontotemporal in location or frontoparietal in location.  She has nausea but no vomiting.  She has light and sound sensitivity.  Prior to her headache she feels that there is going to be a pressure or pain type of a symptom.  Once the headache starts it can last hours.  If she takes the sumatriptan quickly enough the headache will resolve.  She has mild palpitations with the sumatriptan but it typically works 80% of the time.  At present she is taking 2-4 sumatriptan hands per month.  She is averaging 1-2 headaches per month.  Triggers include wine and alcohol.  There can be a hormonal component as headaches can occur around her..  She notes weather can be a trigger with storms and also heat.  Caffeine withdrawal and inconsistent sleep is also a trigger.  She sleeps at 1 AM and depending on the day typically wakes up at 11 AM.  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 Drinks 1 cup of coffee a day. Only drinks 2 cups of water per day.  Does not exercise."  11/10/21 - NOTES FROM DR. MCKEON/MEENAKSHI RECINOS NP  She missed two days of work and one day she left school early due to the headaches. She describes a fuzziness, possible increased heart rate and urination after taking Sumatriptan. Denies photophobia. Her headaches increased with the change in weather initially. She reports relief with one dose of Sumatriptan and Excedrin. She endorses one headache this week-on Tuesday - took one dose of Sumatriptan with relief.   The remaining neurological review of systems is negative.   8/16/21 Regina Mtz is a 26 year old woman with a history of migraines presenting for a consultation for headaches.  Headache Age of onset-many years ago. Increased in frequency when she started college in April.  location-left temporal. sometimes right temporal.  description-pounding. could interfere with her daily activities. she will need to take Excedrin and sleep for relief.  unilateral or bilateral-unilateral.  Quality-pounding. ranging in intensity. Nausea or vomiting-nausea. no vomiting. Photophobia or phonophobia- photophobia. no phonophobia. warning/aura-none post-drome-none nocturnal awakening-none association with exertion or Valsalva- none.  Duration- ranges from 30 minutes to four hours. Average #/week- varies. could be every other day and some weeks less. Association with menstrual cycle- prior to and after menstrual cycle. history of trauma- none contraceptive use- Junel  Triggers-rain, heat, processed sugar, red wine, MS, lack of caffeine  sleep- inconsistent. could wake up with a headache. light snoring. water intake- not enough water. caffeine intake- 1 cup or more.  family history- none medications tried for relief- Feverfew (only for a short time), Excedrin.   past medications tried- Rizatriptan caused drowsiness, ?rapid heart rate, increased urination.  Last MRI Brain reported 2016.   The remaining neurological review of systems.

## 2024-04-02 NOTE — CONSULT LETTER
[Dear  ___] : Dear  [unfilled], [Courtesy Letter:] : I had the pleasure of seeing your patient, [unfilled], in my office today. [Please see my note below.] : Please see my note below. [FreeTextEntry3] : Sincerely,  Pierre Mckeon M.D.

## 2024-04-02 NOTE — PHYSICAL EXAM
[FreeTextEntry1] : Physical examination  General: No acute distress, Awake, Alert.     Mental status  Awake, alert, gives detailed history.     Cranial Nerves  II: VFF   III, IV, VI: PERRL, EOMI.    V: Facial sensation is normal B/L.    VII: Facial strength is normal B/L.  VIII: Gross hearing is intact.    IX, X: Palate is midline and elevates symmetrically.    XI: Trapezius normal strength.    XII: Tongue midline without atrophy or fasciculations.   Motor exam   Muscle tone - no evidence of rigidity or resistance in all 4 extremities.   No atrophy or fasciculations  Muscle Strength: arms and legs, proximal and distal flexors and extensors are normal  No UE drift.  Reflexes  All present, normal, and symmetrical.    Plantars right: mute.    Plantars left: mute.   Coordination  Finger to nose: Normal.   Heel to shin: Normal.     Gait  Normal

## 2024-04-10 ENCOUNTER — APPOINTMENT (OUTPATIENT)
Dept: BARIATRICS/WEIGHT MGMT | Facility: CLINIC | Age: 29
End: 2024-04-10
Payer: SELF-PAY

## 2024-04-10 VITALS — BODY MASS INDEX: 35 KG/M2 | HEIGHT: 62 IN | WEIGHT: 190.2 LBS

## 2024-04-10 PROCEDURE — 97802 MEDICAL NUTRITION INDIV IN: CPT

## 2024-05-10 ENCOUNTER — APPOINTMENT (OUTPATIENT)
Dept: BARIATRICS/WEIGHT MGMT | Facility: CLINIC | Age: 29
End: 2024-05-10
Payer: SELF-PAY

## 2024-05-10 VITALS — HEIGHT: 62 IN | WEIGHT: 190.8 LBS | BODY MASS INDEX: 35.11 KG/M2

## 2024-05-10 PROCEDURE — 97803 MED NUTRITION INDIV SUBSEQ: CPT

## 2024-06-12 ENCOUNTER — APPOINTMENT (OUTPATIENT)
Dept: BARIATRICS/WEIGHT MGMT | Facility: CLINIC | Age: 29
End: 2024-06-12

## 2024-06-14 ENCOUNTER — APPOINTMENT (OUTPATIENT)
Dept: BARIATRICS/WEIGHT MGMT | Facility: CLINIC | Age: 29
End: 2024-06-14
Payer: SELF-PAY

## 2024-06-14 VITALS — HEIGHT: 62 IN | BODY MASS INDEX: 34.96 KG/M2 | WEIGHT: 190 LBS

## 2024-06-14 DIAGNOSIS — E66.9 OBESITY, UNSPECIFIED: ICD-10-CM

## 2024-06-14 PROCEDURE — 97803 MED NUTRITION INDIV SUBSEQ: CPT

## 2024-06-25 NOTE — HISTORY OF PRESENT ILLNESS
Patient has left otitis externa in which she was given Cortisporin for 7 day duration.   Ear wic in place; removed during todays visit.   Plan:  -Continue Cortisporin for total of 7 day duration.   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    [FreeTextEntry8] : 24-year-old female presents with several complaints the first of which is her cyclical migraines-the Maxalt she tried caused palpitations and increased urination. She wants to try something else.\par \par Also complaining about a patch of red dry skin on her upper eyelid near her eyebrow dime size. Also complaining of itchy left ear, no pain.\par \par Since she started working at [employer] she's been getting knee pain and some shin pain. She is wearing shoes that aren't very supportive and is on her feet all day.

## 2024-07-24 ENCOUNTER — APPOINTMENT (OUTPATIENT)
Dept: BARIATRICS/WEIGHT MGMT | Facility: CLINIC | Age: 29
End: 2024-07-24
Payer: SELF-PAY

## 2024-07-24 VITALS — HEIGHT: 62 IN | BODY MASS INDEX: 35.68 KG/M2 | WEIGHT: 193.9 LBS

## 2024-07-24 PROCEDURE — 97803 MED NUTRITION INDIV SUBSEQ: CPT

## 2024-07-29 ENCOUNTER — APPOINTMENT (OUTPATIENT)
Dept: BARIATRICS/WEIGHT MGMT | Facility: CLINIC | Age: 29
End: 2024-07-29
Payer: MEDICAID

## 2024-07-29 ENCOUNTER — NON-APPOINTMENT (OUTPATIENT)
Age: 29
End: 2024-07-29

## 2024-07-29 VITALS
DIASTOLIC BLOOD PRESSURE: 79 MMHG | BODY MASS INDEX: 35.7 KG/M2 | WEIGHT: 194 LBS | HEART RATE: 91 BPM | SYSTOLIC BLOOD PRESSURE: 118 MMHG | HEIGHT: 62 IN | RESPIRATION RATE: 16 BRPM | OXYGEN SATURATION: 97 %

## 2024-07-29 DIAGNOSIS — Z83.49 FAMILY HISTORY OF OTHER ENDOCRINE, NUTRITIONAL AND METABOLIC DISEASES: ICD-10-CM

## 2024-07-29 DIAGNOSIS — Z80.3 FAMILY HISTORY OF MALIGNANT NEOPLASM OF BREAST: ICD-10-CM

## 2024-07-29 DIAGNOSIS — E66.9 OBESITY, UNSPECIFIED: ICD-10-CM

## 2024-07-29 PROCEDURE — 99205 OFFICE O/P NEW HI 60 MIN: CPT

## 2024-07-29 PROCEDURE — G2211 COMPLEX E/M VISIT ADD ON: CPT | Mod: NC

## 2024-07-29 RX ORDER — TOPIRAMATE 50 MG/1
50 TABLET, FILM COATED ORAL
Refills: 0 | Status: ACTIVE | COMMUNITY
Start: 2024-07-29

## 2024-07-29 NOTE — HISTORY OF PRESENT ILLNESS
[FreeTextEntry1] : 29F PMH class II obesity, PCOS, anxiety/depression, ADHD, migraines, who presents to weight management for initial evaluation.   Weight/Diet History: Has gained since being off of Stratera after high school. Started Lexapro at that time, also on Concerta.  Has engaged in weight loss interventions, including Weight Watchers, worked with a dietician, tracked w/[employer].    Weight today: 194 lbs, BMI 35.48, BF 46.5%   Diet: Working with laurita Pfeiffer B (10 am): Scrambled eggs, oatmeal (avoiding packaged sugary instant) L (): Will be smaller if breakfast is bigger, ie Greek yogurt.  D (6 pm): Father cooks. Meat, vegetable, starch Dessert: Snack: not usually Night-time eating: Yes, tries to be healthy (raw vegetables, guacamole, greek yogurt), similar if wakes up in the night. Sometimes desserts after dinner, cookie/ice cream Beverages: diet soda 1x/wk, Coffee with sugar free creamer.  Binging: Fast-food/Restaurants: Take out / fast food 1x/wk Cook/Prepare meals: Added oils: Food Journal: Started recently.    Exercise: No limitations. Walking daily. Yoga in the past.    Sleep: "not the best". from 10 pm to 2 am, awake for a bit, sometimes hungry and eats. Variable amount. Does snore, had a negative sleep study.   Social Hx: Non smoker, rare social alcohol. Works part time at a school

## 2024-07-29 NOTE — ASSESSMENT
[FreeTextEntry1] : 29F PMH class II obesity, PCOS, anxiety/depression, ADHD, migraines, who presents to weight management for initial evaluation.   # Class II obesity: Weight today 194 lbs, BMI 35.48, BF 46.5%. Notes gaining significantly over the past 10 years, ~50 lbs over the past 5.5 years per chart, notable ~10 years ago after stopping stratera and starting Lexapro, is also on concerta now. Has tried dietary interventions, and currently working with dietician. Some food during the day but small portions, struggling with post-dinner eating and overnight. Walks but no other exercise. Sleep is disrupted. We discussed medical options, likely not covered for GLP therapy, she is already on stimulant therapy and topiramate 50 mg/d for migraines.  - Food log - Meal planning/prep - Consider more filling (but still healthy) breakfast/lunches, to help w/night eating patterns and adjust intake schedule.  - C/w dietician - Discussed setting some starting physical activity goals - Consider updated labs at next visit.  - Discussed medical options, GLP therapy not covered. Qsymia and Contrave not ideal because she is already on stimulant therapy with concerta. She takes 50 mg topiramate for migraines. We discussed possibilities 1) increase topiramate toward 100 mg/d, 2) consider phentermine or Qsymia if not on concerta, she may discuss with psychiatrist, 3) Metformin (she reports hx PCOS although may not be officially diagnosed, is pending gyn eval next month). For the time being we agreed to trial of increasing topiramate, particularly as she's struggling with night eating.  - F/u 1-2 mo

## 2024-08-23 ENCOUNTER — APPOINTMENT (OUTPATIENT)
Dept: BARIATRICS/WEIGHT MGMT | Facility: CLINIC | Age: 29
End: 2024-08-23

## 2024-08-23 DIAGNOSIS — E66.9 OBESITY, UNSPECIFIED: ICD-10-CM

## 2024-09-04 ENCOUNTER — RX RENEWAL (OUTPATIENT)
Age: 29
End: 2024-09-04

## 2024-09-19 RX ORDER — MULTIVITAMIN
TABLET ORAL DAILY
Qty: 90 | Refills: 2 | Status: ACTIVE | COMMUNITY
Start: 2024-09-19 | End: 1900-01-01

## 2024-09-27 ENCOUNTER — APPOINTMENT (OUTPATIENT)
Dept: BARIATRICS/WEIGHT MGMT | Facility: CLINIC | Age: 29
End: 2024-09-27
Payer: MEDICAID

## 2024-09-27 DIAGNOSIS — E66.9 OBESITY, UNSPECIFIED: ICD-10-CM

## 2024-09-27 DIAGNOSIS — F41.9 ANXIETY DISORDER, UNSPECIFIED: ICD-10-CM

## 2024-09-27 DIAGNOSIS — F32.A ANXIETY DISORDER, UNSPECIFIED: ICD-10-CM

## 2024-09-27 PROCEDURE — 99214 OFFICE O/P EST MOD 30 MIN: CPT | Mod: 95

## 2024-09-27 PROCEDURE — G2211 COMPLEX E/M VISIT ADD ON: CPT | Mod: NC,95

## 2024-09-27 RX ORDER — SERTRALINE HYDROCHLORIDE 100 MG/1
100 TABLET, FILM COATED ORAL DAILY
Refills: 0 | Status: ACTIVE | COMMUNITY
Start: 2024-09-27

## 2024-09-27 NOTE — PHYSICAL EXAM
[Obese, well nourished, in no acute distress] : obese, well nourished, in no acute distress [de-identified] : TEB visit

## 2024-09-27 NOTE — HISTORY OF PRESENT ILLNESS
[Home] : at home, [unfilled] , at the time of the visit. [Medical Office: ([facility])___] : at the medical office located in  [Verbal consent obtained from patient] : the patient, [unfilled] [FreeTextEntry1] : 29F PMH class II obesity, PCOS, anxiety/depression, ADHD, migraines, who presents to weight management for follow-up.   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 She feels she started gaining when she stopped stratera and started Lexapro. Has engaged in dietary interventions, and was working with dietician. Reported small portions during the day, and struggling with post-dinner and overnight eating. Some walking, otherwise sedentary. Sleep disrupted.  We discussed dietary and lifestyle changes.  We discussed medical treatment options, she was not covered for GLP therapy, was already on stimulant therapy (Concerta) as well as topiramate 50 mg/d for migraines.   Weight today: no weight Weight at Initial Visit (7/29/24): 194 lbs, BMI 35.48, BF 46.5%   Medication: She was already on Concerta and topiramate 50 mg/d for migraines. At initial visit we decided to increase topiramate to 100 mg/d, she was having side effects of depression so she had to lower back 75 mg/d, and also started on Zoloft instead of Lexapro.  Diet: Notes she went on a cruise and has struggled since to get back to a routine. Works with laurita Pfeiffer   Exercise: Walking more on her vacation even though she didn't eat as well.  (No limitations. Walking daily. Yoga in the past.    Sleep:  ("not the best". from 10 pm to 2 am, awake for a bit, sometimes hungry and eats. Variable amount. Does snore, had a negative sleep study.

## 2024-09-27 NOTE — HISTORY OF PRESENT ILLNESS
[Home] : at home, [unfilled] , at the time of the visit. [Medical Office: ([facility])___] : at the medical office located in  [Verbal consent obtained from patient] : the patient, [unfilled] [FreeTextEntry1] : 29F PMH class II obesity, PCOS, anxiety/depression, ADHD, migraines, who presents to weight management for follow-up.   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 She feels she started gaining when she stopped stratera and started Lexapro. Has engaged in dietary interventions, and was working with dietician. Reported small portions during the day, and struggling with post-dinner and overnight eating. Some walking, otherwise sedentary. Sleep disrupted.  We discussed dietary and lifestyle changes.  We discussed medical treatment options, she was not covered for GLP therapy, was already on stimulant therapy (Concerta) as well as topiramate 50 mg/d for migraines.   Weight today: no weight Weight at Initial Visit (7/29/24): 194 lbs, BMI 35.48, BF 46.5%   Medication: She was already on Concerta and topiramate 50 mg/d for migraines. At initial visit we decided to increase topiramate to 100 mg/d, she was having side effects of depression so she had to lower back 75 mg/d, and also started on Zoloft instead of Lexapro.  Diet: Notes she went on a cruise and has struggled since to get back to a routine. Works with laurita Pfeiffer   Exercise: Walking more on her vacation even though she didn't eat as well.  (No limitations. Walking daily. Yoga in the past.    Sleep:  ("not the best". from 10 pm to 2 am, awake for a bit, sometimes hungry and eats. Variable amount. Does snore, had a negative sleep study.

## 2024-09-27 NOTE — ASSESSMENT
[FreeTextEntry1] : 29F PMH class II obesity, PCOS, anxiety/depression, ADHD, migraines, who presents to weight management for follow-up.   # Class II obesity: She increased topiramate from 50 mg/d (migraines) to 100 mg/d after our visit, notes depression had been worsening and may have been exacerbated by increase, so her psychiatrist lowered it to 75 mg/d and switched her lexapro 20 mg/d to Zoloft 100 mg/d. We discussed possible Metformin, has appt with her OB coming up, she will reach out. Has struggled more getting on track with diet/exercise since getting back from [place].  - Food log - Meal planning/prep - Consider more filling (but still healthy) breakfast/lunches, to help w/night eating patterns and adjust intake schedule.  - C/w dietician - Discussed setting some starting physical activity goals - 10 min 5x/wk on treadmill, maybe 2 of them longer.  - Consider updated labs at next visit.  - C/w 75 mg/d of topiramate, monitor w/psych/neuro - May consider adding metformin (pending Gyn visit and is looking to be evaluted for formal PCOS dx) - F/u 1-2 mo   Top adjusts... add metf... change concert Well/Ph... add Nal? papRy if virgil

## 2024-09-27 NOTE — PHYSICAL EXAM
[Obese, well nourished, in no acute distress] : obese, well nourished, in no acute distress [de-identified] : TEB visit

## 2024-09-27 NOTE — ASSESSMENT
[FreeTextEntry1] : 29F PMH class II obesity, PCOS, anxiety/depression, ADHD, migraines, who presents to weight management for follow-up.   # Class II obesity: She increased topiramate from 50 mg/d (migraines) to 100 mg/d after our visit, notes depression had been worsening and may have been exacerbated by increase, so her psychiatrist lowered it to 75 mg/d and switched her lexapro 20 mg/d to Zoloft 100 mg/d. We discussed possible Metformin, has appt with her OB coming up, she will reach out. Has struggled more getting on track with diet/exercise since getting back from [place].  - Food log - Meal planning/prep - Consider more filling (but still healthy) breakfast/lunches, to help w/night eating patterns and adjust intake schedule.  - C/w dietician - Discussed setting some starting physical activity goals - 10 min 5x/wk on treadmill, maybe 2 of them longer.  - Consider updated labs at next visit.  - C/w 75 mg/d of topiramate, monitor w/psych/neuro - May consider adding metformin (pending Gyn visit and is looking to be evaluted for formal PCOS dx) - F/u 1-2 mo   Top adjusts... add metf... change concert Well/Ph... add Nal? papRy if virgil

## 2024-10-01 ENCOUNTER — APPOINTMENT (OUTPATIENT)
Dept: OBGYN | Facility: CLINIC | Age: 29
End: 2024-10-01
Payer: MEDICAID

## 2024-10-01 VITALS
SYSTOLIC BLOOD PRESSURE: 106 MMHG | BODY MASS INDEX: 35.88 KG/M2 | DIASTOLIC BLOOD PRESSURE: 70 MMHG | WEIGHT: 195 LBS | HEIGHT: 62 IN

## 2024-10-01 DIAGNOSIS — N93.9 ABNORMAL UTERINE AND VAGINAL BLEEDING, UNSPECIFIED: ICD-10-CM

## 2024-10-01 DIAGNOSIS — Z01.419 ENCOUNTER FOR GYNECOLOGICAL EXAMINATION (GENERAL) (ROUTINE) W/OUT ABNORMAL FINDINGS: ICD-10-CM

## 2024-10-01 DIAGNOSIS — Z80.3 FAMILY HISTORY OF MALIGNANT NEOPLASM OF BREAST: ICD-10-CM

## 2024-10-01 PROCEDURE — 99459 PELVIC EXAMINATION: CPT

## 2024-10-01 PROCEDURE — 99385 PREV VISIT NEW AGE 18-39: CPT

## 2024-10-01 RX ORDER — NORETHINDRONE ACETATE AND ETHINYL ESTRADIOL 1; 20 MG/1; UG/1
1-20 TABLET ORAL
Qty: 84 | Refills: 4 | Status: ACTIVE | COMMUNITY
Start: 2024-10-01 | End: 1900-01-01

## 2024-10-01 NOTE — PHYSICAL EXAM
[Chaperone Present] : A chaperone was present in the examining room during all aspects of the physical examination [[id number]] : A chaperone was present during the pelvic exam. [Appropriately responsive] : appropriately responsive [Alert] : alert [No Acute Distress] : no acute distress [No Lymphadenopathy] : no lymphadenopathy [Regular Rate Rhythm] : regular rate rhythm [No Murmurs] : no murmurs [Clear to Auscultation B/L] : clear to auscultation bilaterally [Soft] : soft [Non-tender] : non-tender [Non-distended] : non-distended [No HSM] : No HSM [No Lesions] : no lesions [No Mass] : no mass [Oriented x3] : oriented x3 [Examination Of The Breasts] : a normal appearance [No Masses] : no breast masses were palpable [Labia Majora] : normal [Labia Minora] : normal [Normal] : normal [Normal Position] : in a normal position [Uterine Adnexae] : non-palpable [FreeTextEntry2] : CHIQUI Perez [Tenderness] : nontender

## 2024-10-01 NOTE — COUNSELING
[Nutrition/ Exercise/ Weight Management] : nutrition, exercise, weight management [Vitamins/Supplements] : vitamins/supplements [Sunscreen] : sunscreen [Drugs] : drugs [Breast Self Exam] : breast self exam [Contraception/ Emergency Contraception/ Safe Sexual Practices] : contraception, emergency contraception, safe sexual practices [STD (testing, results, tx)] : STD (testing, results, tx) [HPV Vaccine] : HPV Vaccine [Medication Management] : medication management

## 2024-10-01 NOTE — HISTORY OF PRESENT ILLNESS
[Patient reported PAP Smear was normal] : Patient reported PAP Smear was normal [Y] : Patient uses contraception [N] : Patient denies prior pregnancies [Never active] : never active [Patient refuses STI testing] : Patient refuses STI testing [FreeTextEntry1] : 29-year-old G0 here for well woman exam.  Patient denies pelvic pain or abnormal discharge.  Has prolonged history of irregular menses.  Concerned that she may have PCOS previously discussed with prior GYN did not have complete workup. Has concerns for hirsutism and obesity and irregular cycles. Not sexually active currently taking Junel for helping with regulation of her cycle as well as minimizing dysmenorrhea. Uses the continuous method. Patient is aware that her other medications do impede the efficacy of her contraception in regards to prevention of pregnancy.  Patient has been on Aparna for at least a year and a half uses the continuous method has irregular spotting when she has it.  It lasts at least for 1 week.  Working on weight loss with her PCP exercises 10 minutes a day.  GYN history: Denies fibroids cysts abnormal Pap. Virginal, Has not received the Gardasil series  Family history: Mother with history of breast cancer in mid to late 50s maternal grandmother with diagnosis of breast cancer in her 80s [PapSmeardate] : 2019

## 2024-10-02 ENCOUNTER — ASOB RESULT (OUTPATIENT)
Age: 29
End: 2024-10-02

## 2024-10-02 ENCOUNTER — APPOINTMENT (OUTPATIENT)
Dept: OBGYN | Facility: CLINIC | Age: 29
End: 2024-10-02
Payer: MEDICAID

## 2024-10-02 LAB
ALBUMIN SERPL ELPH-MCNC: 4 G/DL
ALP BLD-CCNC: 122 U/L
ALT SERPL-CCNC: 14 U/L
ANION GAP SERPL CALC-SCNC: 17 MMOL/L
AST SERPL-CCNC: 27 U/L
BASOPHILS # BLD AUTO: 0.07 K/UL
BASOPHILS NFR BLD AUTO: 0.6 %
BILIRUB SERPL-MCNC: <0.2 MG/DL
BUN SERPL-MCNC: 18 MG/DL
CALCIUM SERPL-MCNC: 9.5 MG/DL
CHLORIDE SERPL-SCNC: 108 MMOL/L
CO2 SERPL-SCNC: 17 MMOL/L
CREAT SERPL-MCNC: 0.64 MG/DL
EGFR: 123 ML/MIN/1.73M2
EOSINOPHIL # BLD AUTO: 0.2 K/UL
EOSINOPHIL NFR BLD AUTO: 1.7 %
ESTIMATED AVERAGE GLUCOSE: 111 MG/DL
ESTRADIOL SERPL-MCNC: 21 PG/ML
FSH SERPL-MCNC: 3.8 IU/L
GLUCOSE SERPL-MCNC: 68 MG/DL
HBA1C MFR BLD HPLC: 5.5 %
HCT VFR BLD CALC: 40.3 %
HGB BLD-MCNC: 11.8 G/DL
HPV HIGH+LOW RISK DNA PNL CVX: NOT DETECTED
IMM GRANULOCYTES NFR BLD AUTO: 0.3 %
LH SERPL-ACNC: 6.5 IU/L
LYMPHOCYTES # BLD AUTO: 3.3 K/UL
LYMPHOCYTES NFR BLD AUTO: 27.7 %
MAN DIFF?: NORMAL
MCHC RBC-ENTMCNC: 21.8 PG
MCHC RBC-ENTMCNC: 29.3 GM/DL
MCV RBC AUTO: 74.5 FL
MONOCYTES # BLD AUTO: 0.97 K/UL
MONOCYTES NFR BLD AUTO: 8.1 %
NEUTROPHILS # BLD AUTO: 7.34 K/UL
NEUTROPHILS NFR BLD AUTO: 61.6 %
PLATELET # BLD AUTO: 387 K/UL
POTASSIUM SERPL-SCNC: 4.8 MMOL/L
PROLACTIN SERPL-MCNC: 16.3 NG/ML
PROT SERPL-MCNC: 7.4 G/DL
RBC # BLD: 5.41 M/UL
RBC # FLD: 17.2 %
SODIUM SERPL-SCNC: 142 MMOL/L
TSH SERPL-ACNC: 2.02 UIU/ML
WBC # FLD AUTO: 11.92 K/UL

## 2024-10-02 PROCEDURE — 76830 TRANSVAGINAL US NON-OB: CPT

## 2024-10-08 LAB
CYTOLOGY CVX/VAG DOC THIN PREP: NORMAL
TESTOST FREE SERPL-MCNC: 2.4 PG/ML
TESTOST SERPL-MCNC: 17 NG/DL

## 2024-10-15 ENCOUNTER — APPOINTMENT (OUTPATIENT)
Dept: OBGYN | Facility: CLINIC | Age: 29
End: 2024-10-15
Payer: MEDICAID

## 2024-10-15 DIAGNOSIS — E28.2 POLYCYSTIC OVARIAN SYNDROME: ICD-10-CM

## 2024-10-15 DIAGNOSIS — N93.9 ABNORMAL UTERINE AND VAGINAL BLEEDING, UNSPECIFIED: ICD-10-CM

## 2024-10-15 PROCEDURE — 99214 OFFICE O/P EST MOD 30 MIN: CPT | Mod: 95

## 2024-10-15 PROCEDURE — G2211 COMPLEX E/M VISIT ADD ON: CPT | Mod: NC,95

## 2024-10-18 ENCOUNTER — APPOINTMENT (OUTPATIENT)
Dept: BARIATRICS/WEIGHT MGMT | Facility: CLINIC | Age: 29
End: 2024-10-18
Payer: SELF-PAY

## 2024-10-18 VITALS — WEIGHT: 197.4 LBS | HEIGHT: 62 IN | BODY MASS INDEX: 36.33 KG/M2

## 2024-10-18 DIAGNOSIS — E66.9 OBESITY, UNSPECIFIED: ICD-10-CM

## 2024-10-18 PROCEDURE — 97803 MED NUTRITION INDIV SUBSEQ: CPT

## 2024-10-21 RX ORDER — METFORMIN ER 500 MG 500 MG/1
500 TABLET ORAL
Qty: 90 | Refills: 1 | Status: ACTIVE | COMMUNITY
Start: 2024-10-21 | End: 1900-01-01

## 2024-11-06 ENCOUNTER — APPOINTMENT (OUTPATIENT)
Dept: FAMILY MEDICINE | Facility: CLINIC | Age: 29
End: 2024-11-06

## 2024-11-06 VITALS
HEART RATE: 80 BPM | DIASTOLIC BLOOD PRESSURE: 70 MMHG | OXYGEN SATURATION: 98 % | HEIGHT: 62 IN | BODY MASS INDEX: 35.33 KG/M2 | WEIGHT: 192 LBS | SYSTOLIC BLOOD PRESSURE: 126 MMHG

## 2024-11-06 PROCEDURE — 99395 PREV VISIT EST AGE 18-39: CPT | Mod: 25

## 2024-11-06 RX ORDER — METFORMIN HYDROCHLORIDE 500 MG/1
500 TABLET, COATED ORAL
Refills: 0 | Status: ACTIVE | COMMUNITY

## 2024-11-07 LAB
ALBUMIN SERPL ELPH-MCNC: 4.2 G/DL
ALP BLD-CCNC: 123 U/L
ALT SERPL-CCNC: 15 U/L
ANION GAP SERPL CALC-SCNC: 18 MMOL/L
AST SERPL-CCNC: 16 U/L
BASOPHILS # BLD AUTO: 0.05 K/UL
BASOPHILS NFR BLD AUTO: 0.4 %
BILIRUB SERPL-MCNC: 0.2 MG/DL
BUN SERPL-MCNC: 13 MG/DL
CALCIUM SERPL-MCNC: 9.3 MG/DL
CHLORIDE SERPL-SCNC: 105 MMOL/L
CHOLEST SERPL-MCNC: 149 MG/DL
CO2 SERPL-SCNC: 18 MMOL/L
CREAT SERPL-MCNC: 0.57 MG/DL
EGFR: 127 ML/MIN/1.73M2
EOSINOPHIL # BLD AUTO: 0.14 K/UL
EOSINOPHIL NFR BLD AUTO: 1.1 %
ESTIMATED AVERAGE GLUCOSE: 114 MG/DL
FERRITIN SERPL-MCNC: 85 NG/ML
FOLATE SERPL-MCNC: >20 NG/ML
GLUCOSE SERPL-MCNC: 70 MG/DL
HBA1C MFR BLD HPLC: 5.6 %
HCT VFR BLD CALC: 38.9 %
HDLC SERPL-MCNC: 49 MG/DL
HGB BLD-MCNC: 11.4 G/DL
IMM GRANULOCYTES NFR BLD AUTO: 0.2 %
IRON SATN MFR SERPL: 12 %
IRON SERPL-MCNC: 33 UG/DL
LDLC SERPL CALC-MCNC: 83 MG/DL
LYMPHOCYTES # BLD AUTO: 2.79 K/UL
LYMPHOCYTES NFR BLD AUTO: 22.6 %
MAN DIFF?: NORMAL
MCHC RBC-ENTMCNC: 21.6 PG
MCHC RBC-ENTMCNC: 29.3 G/DL
MCV RBC AUTO: 73.5 FL
MONOCYTES # BLD AUTO: 0.79 K/UL
MONOCYTES NFR BLD AUTO: 6.4 %
NEUTROPHILS # BLD AUTO: 8.54 K/UL
NEUTROPHILS NFR BLD AUTO: 69.3 %
NONHDLC SERPL-MCNC: 100 MG/DL
PLATELET # BLD AUTO: 450 K/UL
POTASSIUM SERPL-SCNC: 4.7 MMOL/L
PROT SERPL-MCNC: 7.9 G/DL
RBC # BLD: 5.29 M/UL
RBC # FLD: 17.3 %
SODIUM SERPL-SCNC: 141 MMOL/L
TIBC SERPL-MCNC: 278 UG/DL
TRIGL SERPL-MCNC: 90 MG/DL
TSH SERPL-ACNC: 2.9 UIU/ML
UIBC SERPL-MCNC: 246 UG/DL
VIT B12 SERPL-MCNC: 513 PG/ML
WBC # FLD AUTO: 12.34 K/UL

## 2024-11-08 LAB
HGB A MFR BLD: 97.7 %
HGB A2 MFR BLD: 2.3 %
HGB FRACT BLD-IMP: NORMAL

## 2024-11-15 ENCOUNTER — RX RENEWAL (OUTPATIENT)
Age: 29
End: 2024-11-15

## 2024-11-27 ENCOUNTER — APPOINTMENT (OUTPATIENT)
Dept: BARIATRICS/WEIGHT MGMT | Facility: CLINIC | Age: 29
End: 2024-11-27
Payer: MEDICAID

## 2024-11-27 DIAGNOSIS — F32.A ANXIETY DISORDER, UNSPECIFIED: ICD-10-CM

## 2024-11-27 DIAGNOSIS — E28.2 POLYCYSTIC OVARIAN SYNDROME: ICD-10-CM

## 2024-11-27 DIAGNOSIS — F41.9 ANXIETY DISORDER, UNSPECIFIED: ICD-10-CM

## 2024-11-27 DIAGNOSIS — E66.9 OBESITY, UNSPECIFIED: ICD-10-CM

## 2024-11-27 PROCEDURE — 99214 OFFICE O/P EST MOD 30 MIN: CPT | Mod: 95

## 2024-12-03 ENCOUNTER — APPOINTMENT (OUTPATIENT)
Dept: NEUROLOGY | Facility: CLINIC | Age: 29
End: 2024-12-03
Payer: MEDICAID

## 2024-12-03 VITALS
HEART RATE: 89 BPM | BODY MASS INDEX: 35.12 KG/M2 | WEIGHT: 192 LBS | OXYGEN SATURATION: 96 % | SYSTOLIC BLOOD PRESSURE: 123 MMHG | DIASTOLIC BLOOD PRESSURE: 84 MMHG

## 2024-12-03 DIAGNOSIS — G43.009 MIGRAINE W/OUT AURA, NOT INTRACTABLE, W/OUT STATUS MIGRAINOSUS: ICD-10-CM

## 2024-12-03 PROCEDURE — 99213 OFFICE O/P EST LOW 20 MIN: CPT

## 2024-12-16 ENCOUNTER — RX RENEWAL (OUTPATIENT)
Age: 29
End: 2024-12-16

## 2025-01-17 ENCOUNTER — APPOINTMENT (OUTPATIENT)
Dept: BARIATRICS/WEIGHT MGMT | Facility: CLINIC | Age: 30
End: 2025-01-17

## 2025-01-31 ENCOUNTER — APPOINTMENT (OUTPATIENT)
Dept: BARIATRICS/WEIGHT MGMT | Facility: CLINIC | Age: 30
End: 2025-01-31

## 2025-02-07 ENCOUNTER — APPOINTMENT (OUTPATIENT)
Dept: BARIATRICS/WEIGHT MGMT | Facility: CLINIC | Age: 30
End: 2025-02-07

## 2025-02-12 ENCOUNTER — APPOINTMENT (OUTPATIENT)
Dept: BARIATRICS/WEIGHT MGMT | Facility: CLINIC | Age: 30
End: 2025-02-12
Payer: MEDICAID

## 2025-02-12 VITALS — HEIGHT: 62 IN | BODY MASS INDEX: 35.33 KG/M2 | WEIGHT: 192 LBS

## 2025-02-12 DIAGNOSIS — E66.9 OBESITY, UNSPECIFIED: ICD-10-CM

## 2025-02-12 PROCEDURE — 97803 MED NUTRITION INDIV SUBSEQ: CPT | Mod: 95

## 2025-02-26 ENCOUNTER — APPOINTMENT (OUTPATIENT)
Dept: BARIATRICS/WEIGHT MGMT | Facility: CLINIC | Age: 30
End: 2025-02-26

## 2025-02-27 ENCOUNTER — APPOINTMENT (OUTPATIENT)
Dept: BARIATRICS/WEIGHT MGMT | Facility: CLINIC | Age: 30
End: 2025-02-27
Payer: MEDICAID

## 2025-02-27 VITALS — HEIGHT: 62 IN | WEIGHT: 186 LBS | BODY MASS INDEX: 34.23 KG/M2

## 2025-02-27 DIAGNOSIS — E66.9 OBESITY, UNSPECIFIED: ICD-10-CM

## 2025-02-27 DIAGNOSIS — E28.2 POLYCYSTIC OVARIAN SYNDROME: ICD-10-CM

## 2025-02-27 PROCEDURE — 99214 OFFICE O/P EST MOD 30 MIN: CPT | Mod: 95

## 2025-04-10 ENCOUNTER — NON-APPOINTMENT (OUTPATIENT)
Age: 30
End: 2025-04-10

## 2025-05-02 ENCOUNTER — APPOINTMENT (OUTPATIENT)
Dept: BARIATRICS/WEIGHT MGMT | Facility: CLINIC | Age: 30
End: 2025-05-02
Payer: SELF-PAY

## 2025-05-02 VITALS — HEIGHT: 62 IN | BODY MASS INDEX: 34.19 KG/M2 | WEIGHT: 185.8 LBS

## 2025-05-02 PROCEDURE — 97803 MED NUTRITION INDIV SUBSEQ: CPT

## 2025-05-07 ENCOUNTER — NON-APPOINTMENT (OUTPATIENT)
Age: 30
End: 2025-05-07

## 2025-05-07 ENCOUNTER — APPOINTMENT (OUTPATIENT)
Dept: BARIATRICS/WEIGHT MGMT | Facility: CLINIC | Age: 30
End: 2025-05-07
Payer: MEDICAID

## 2025-05-07 VITALS
HEIGHT: 62 IN | DIASTOLIC BLOOD PRESSURE: 60 MMHG | BODY MASS INDEX: 34.41 KG/M2 | SYSTOLIC BLOOD PRESSURE: 116 MMHG | WEIGHT: 187 LBS | HEART RATE: 94 BPM

## 2025-05-07 DIAGNOSIS — E28.2 POLYCYSTIC OVARIAN SYNDROME: ICD-10-CM

## 2025-05-07 DIAGNOSIS — E66.9 OBESITY, UNSPECIFIED: ICD-10-CM

## 2025-05-07 PROCEDURE — 99214 OFFICE O/P EST MOD 30 MIN: CPT

## 2025-05-07 PROCEDURE — G2211 COMPLEX E/M VISIT ADD ON: CPT | Mod: NC

## 2025-05-12 ENCOUNTER — RX RENEWAL (OUTPATIENT)
Age: 30
End: 2025-05-12

## 2025-06-24 ENCOUNTER — RX RENEWAL (OUTPATIENT)
Age: 30
End: 2025-06-24

## 2025-07-29 ENCOUNTER — APPOINTMENT (OUTPATIENT)
Dept: NEUROLOGY | Facility: CLINIC | Age: 30
End: 2025-07-29
Payer: MEDICAID

## 2025-07-29 VITALS
WEIGHT: 185 LBS | HEART RATE: 97 BPM | BODY MASS INDEX: 34.04 KG/M2 | HEIGHT: 62 IN | DIASTOLIC BLOOD PRESSURE: 72 MMHG | SYSTOLIC BLOOD PRESSURE: 115 MMHG | OXYGEN SATURATION: 98 %

## 2025-07-29 DIAGNOSIS — G43.009 MIGRAINE W/OUT AURA, NOT INTRACTABLE, W/OUT STATUS MIGRAINOSUS: ICD-10-CM

## 2025-07-29 PROCEDURE — 99214 OFFICE O/P EST MOD 30 MIN: CPT
